# Patient Record
Sex: FEMALE | Race: WHITE | Employment: OTHER | URBAN - METROPOLITAN AREA
[De-identification: names, ages, dates, MRNs, and addresses within clinical notes are randomized per-mention and may not be internally consistent; named-entity substitution may affect disease eponyms.]

---

## 2022-12-12 ENCOUNTER — OFFICE VISIT (OUTPATIENT)
Dept: PRIMARY CARE CLINIC | Age: 37
End: 2022-12-12
Payer: MEDICARE

## 2022-12-12 VITALS
WEIGHT: 134.6 LBS | SYSTOLIC BLOOD PRESSURE: 116 MMHG | HEART RATE: 102 BPM | OXYGEN SATURATION: 98 % | DIASTOLIC BLOOD PRESSURE: 78 MMHG | TEMPERATURE: 102 F

## 2022-12-12 DIAGNOSIS — R11.0 NAUSEA: ICD-10-CM

## 2022-12-12 DIAGNOSIS — R06.2 WHEEZING: ICD-10-CM

## 2022-12-12 DIAGNOSIS — R05.9 COUGH, UNSPECIFIED TYPE: Primary | ICD-10-CM

## 2022-12-12 DIAGNOSIS — R68.83 CHILLS: ICD-10-CM

## 2022-12-12 LAB
Lab: NORMAL
PERFORMING INSTRUMENT: NORMAL
QC PASS/FAIL: NORMAL
SARS-COV-2, POC: NORMAL

## 2022-12-12 PROCEDURE — 99203 OFFICE O/P NEW LOW 30 MIN: CPT | Performed by: NURSE PRACTITIONER

## 2022-12-12 PROCEDURE — 87426 SARSCOV CORONAVIRUS AG IA: CPT | Performed by: NURSE PRACTITIONER

## 2022-12-12 RX ORDER — DEXTROMETHORPHAN POLISTIREX 30 MG/5ML
60 SUSPENSION ORAL 2 TIMES DAILY PRN
Qty: 89 ML | Refills: 0 | Status: SHIPPED | OUTPATIENT
Start: 2022-12-12 | End: 2022-12-22

## 2022-12-12 RX ORDER — AMOXICILLIN AND CLAVULANATE POTASSIUM 875; 125 MG/1; MG/1
1 TABLET, FILM COATED ORAL 2 TIMES DAILY
Qty: 20 TABLET | Refills: 0 | Status: SHIPPED | OUTPATIENT
Start: 2022-12-12 | End: 2022-12-22

## 2022-12-12 RX ORDER — CYCLOBENZAPRINE HCL 10 MG
TABLET ORAL
COMMUNITY
Start: 2022-12-10

## 2022-12-12 RX ORDER — PREDNISONE 10 MG/1
10 TABLET ORAL 2 TIMES DAILY
Qty: 10 TABLET | Refills: 0 | Status: SHIPPED | OUTPATIENT
Start: 2022-12-12 | End: 2022-12-17

## 2022-12-12 RX ORDER — GOLIMUMAB 50 MG/.5ML
INJECTION, SOLUTION SUBCUTANEOUS
COMMUNITY
Start: 2022-11-23

## 2022-12-12 RX ORDER — ASPIRIN 81 MG/1
81 TABLET, CHEWABLE ORAL DAILY
COMMUNITY

## 2022-12-12 RX ORDER — LORAZEPAM 1 MG/1
TABLET ORAL
COMMUNITY
Start: 2022-12-10

## 2022-12-12 ASSESSMENT — PATIENT HEALTH QUESTIONNAIRE - PHQ9
SUM OF ALL RESPONSES TO PHQ9 QUESTIONS 1 & 2: 0
SUM OF ALL RESPONSES TO PHQ QUESTIONS 1-9: 0
SUM OF ALL RESPONSES TO PHQ QUESTIONS 1-9: 0
1. LITTLE INTEREST OR PLEASURE IN DOING THINGS: 0
2. FEELING DOWN, DEPRESSED OR HOPELESS: 0
SUM OF ALL RESPONSES TO PHQ QUESTIONS 1-9: 0
SUM OF ALL RESPONSES TO PHQ QUESTIONS 1-9: 0

## 2022-12-12 NOTE — LETTER
[address] [facility] [address]  Phone: [phone]  Fax: [phone]    WILLIAM Johnson CNP        December 12, 2022     Patient: Mickie Gleason   YOB: 1985   Date of Visit: 12/12/2022       To Whom It May Concern: It is my medical opinion that Zuleyka Rios should remain out of work until 14 Dec 2022. If you have any questions or concerns, please don't hesitate to call.     Sincerely,            WILLIAM Johnson CNP

## 2022-12-12 NOTE — PROGRESS NOTES
Chief Complaint   Fever, Cough (X 1 day  ), Headache, Fatigue, Nausea, Chills, and Wheezing      History of Present Illness   Source of history provided by:  patientKimmy Munoz is a 40 y.o. old female who presents to the flu clinic with complaints of Fever, Cough (X 1 day  ), Headache, Fatigue, Nausea, Chills, and Wheezing x 1 days. States symptoms have stayed the same since onset. Has been taking none without symptomatic relief. Denies any Shortness of breath. Denies any hx of no history of pneumonia or bronchitis. ROS   Pertinent positives and negatives are stated within HPI, all other systems reviewed and are negative. Past Medical History:  has no past medical history on file. Past Surgical History:  has no past surgical history on file. Social History:  reports that she has never smoked. She has never used smokeless tobacco.  Family History: family history is not on file. Allergies: Doxycycline, Cephalexin, Iodinated diagnostic agents, Mercaptopurine, Mesalamine, Nsaids, Azithromycin, and Sulfa antibiotics    Physical Exam   Vital Signs:  /78 (Site: Left Upper Arm, Position: Sitting, Cuff Size: Large Adult)   Pulse (!) 102   Temp (!) 102 °F (38.9 °C) (Temporal)   Wt 134 lb 9.6 oz (61.1 kg)   SpO2 98%    Oxygen Saturation Interpretation: Normal.    Constitutional:  Alert, development consistent with age. NAD. Head:  NC/NT. Airway patent. Ears: TMs Clear bilaterally. Canals without exudate or swelling bilaterally. Mouth: Posterior pharynx with mild erythema and clear postnasal drip. no tonsillar hypertrophy or exudate. Neck:  Normal ROM. Supple. no anterior cervical adenopathy noted. Lungs: CTAB without wheezes, rales, or rhonchi. CV:  Regular rate and rhythm, normal heart sounds, without pathological murmurs, ectopy, gallops, or rubs. Skin:  Normal turgor. Warm, dry, without visible rash. Lymphatic: No lymphangitis or adenopathy noted. Neurological:  Oriented.

## 2023-02-16 PROBLEM — R79.0 LOW MAGNESIUM LEVEL: Status: ACTIVE | Noted: 2023-02-16

## 2023-02-16 PROBLEM — E87.6 LOW BLOOD POTASSIUM: Status: ACTIVE | Noted: 2023-02-16

## 2023-02-16 PROBLEM — R00.2 HEART PALPITATIONS: Status: ACTIVE | Noted: 2023-02-16

## 2023-02-16 PROBLEM — R35.89 POLYURIA: Status: ACTIVE | Noted: 2023-02-16

## 2023-02-16 PROBLEM — R07.89 ATYPICAL CHEST PAIN: Status: ACTIVE | Noted: 2023-02-16

## 2023-02-16 PROBLEM — M85.80 OSTEOPENIA: Status: ACTIVE | Noted: 2023-02-16

## 2023-02-16 PROBLEM — F41.0 PANIC DISORDER WITHOUT AGORAPHOBIA: Status: ACTIVE | Noted: 2023-02-16

## 2023-02-16 PROBLEM — Q21.12 PATENT FORAMEN OVALE (HHS-HCC): Status: ACTIVE | Noted: 2023-02-16

## 2023-02-16 PROBLEM — G45.9 TIA (TRANSIENT ISCHEMIC ATTACK): Status: ACTIVE | Noted: 2023-02-16

## 2023-02-16 PROBLEM — S91.332A PUNCTURE WOUND OF LEFT FOOT: Status: ACTIVE | Noted: 2023-02-16

## 2023-02-16 PROBLEM — Z87.898 HISTORY OF SEIZURE: Status: ACTIVE | Noted: 2023-02-16

## 2023-02-16 PROBLEM — R59.1 LYMPHADENOPATHY: Status: ACTIVE | Noted: 2023-02-16

## 2023-02-16 PROBLEM — U07.1 COVID-19 VIRUS DETECTED: Status: ACTIVE | Noted: 2023-02-16

## 2023-02-16 PROBLEM — R40.0 DAYTIME SOMNOLENCE: Status: ACTIVE | Noted: 2023-02-16

## 2023-02-16 PROBLEM — Z86.73 HISTORY OF STROKE: Status: ACTIVE | Noted: 2023-02-16

## 2023-02-16 PROBLEM — T24.202A SECOND DEGREE BURN OF LEFT LEG: Status: ACTIVE | Noted: 2023-02-16

## 2023-02-16 PROBLEM — R53.83 FATIGUE: Status: ACTIVE | Noted: 2023-02-16

## 2023-02-16 PROBLEM — E55.9 VITAMIN D DEFICIENCY: Status: ACTIVE | Noted: 2023-02-16

## 2023-02-16 PROBLEM — R07.9 CHEST PAIN: Status: ACTIVE | Noted: 2023-02-16

## 2023-02-16 PROBLEM — M25.50 JOINT PAIN: Status: ACTIVE | Noted: 2023-02-16

## 2023-02-16 PROBLEM — A49.02 MRSA INFECTION: Status: ACTIVE | Noted: 2023-02-16

## 2023-02-16 PROBLEM — R23.3 PETECHIAL RASH: Status: ACTIVE | Noted: 2023-02-16

## 2023-02-16 PROBLEM — K76.9 LIVER LESION: Status: ACTIVE | Noted: 2023-02-16

## 2023-02-16 PROBLEM — G47.33 OBSTRUCTIVE SLEEP APNEA: Status: ACTIVE | Noted: 2023-02-16

## 2023-02-16 PROBLEM — L02.31 ABSCESS, GLUTEAL: Status: ACTIVE | Noted: 2023-02-16

## 2023-02-16 PROBLEM — M06.9 RHEUMATOID ARTHRITIS (MULTI): Status: ACTIVE | Noted: 2023-02-16

## 2023-02-16 PROBLEM — K50.90 CROHN'S DISEASE (MULTI): Status: ACTIVE | Noted: 2023-02-16

## 2023-02-16 PROBLEM — R23.3 BRUISING, SPONTANEOUS: Status: ACTIVE | Noted: 2023-02-16

## 2023-02-16 PROBLEM — F41.9 ANXIETY: Status: ACTIVE | Noted: 2023-02-16

## 2023-02-16 RX ORDER — CYCLOBENZAPRINE HCL 10 MG
1 TABLET ORAL 2 TIMES DAILY
COMMUNITY
End: 2023-08-21 | Stop reason: SDUPTHER

## 2023-02-16 RX ORDER — ST. JOHN'S WORT 300 MG
1 CAPSULE ORAL 2 TIMES DAILY
COMMUNITY
Start: 2022-03-30

## 2023-02-16 RX ORDER — LANOLIN ALCOHOL/MO/W.PET/CERES
1 CREAM (GRAM) TOPICAL 2 TIMES DAILY
COMMUNITY
Start: 2021-03-03

## 2023-02-16 RX ORDER — LORAZEPAM 1 MG/1
1 TABLET ORAL 2 TIMES DAILY
COMMUNITY
End: 2023-03-13 | Stop reason: SDUPTHER

## 2023-02-16 RX ORDER — ASPIRIN 81 MG/1
1 TABLET ORAL DAILY
COMMUNITY
Start: 2021-03-09

## 2023-02-16 RX ORDER — HYDROXYZINE HYDROCHLORIDE 25 MG/1
1 TABLET, FILM COATED ORAL 3 TIMES DAILY PRN
COMMUNITY
Start: 2021-10-06 | End: 2023-08-21 | Stop reason: SDUPTHER

## 2023-02-16 RX ORDER — GOLIMUMAB 50 MG/.5ML
INJECTION, SOLUTION SUBCUTANEOUS
COMMUNITY
Start: 2021-07-27

## 2023-02-16 RX ORDER — ASPIRIN 325 MG
1 TABLET, DELAYED RELEASE (ENTERIC COATED) ORAL
COMMUNITY
Start: 2020-11-18

## 2023-03-06 LAB
ALANINE AMINOTRANSFERASE (SGPT) (U/L) IN SER/PLAS: 11 U/L (ref 7–45)
ALBUMIN (G/DL) IN SER/PLAS: 4.2 G/DL (ref 3.4–5)
ALKALINE PHOSPHATASE (U/L) IN SER/PLAS: 48 U/L (ref 33–110)
ANION GAP IN SER/PLAS: 13 MMOL/L (ref 10–20)
ASPARTATE AMINOTRANSFERASE (SGOT) (U/L) IN SER/PLAS: 13 U/L (ref 9–39)
BILIRUBIN TOTAL (MG/DL) IN SER/PLAS: 0.3 MG/DL (ref 0–1.2)
C REACTIVE PROTEIN (MG/L) IN SER/PLAS: <0.1 MG/DL
C. DIFFICILE TOXIN, PCR: NORMAL
CALCIUM (MG/DL) IN SER/PLAS: 9 MG/DL (ref 8.6–10.3)
CALPROTECTIN, STOOL: NORMAL
CARBON DIOXIDE, TOTAL (MMOL/L) IN SER/PLAS: 27 MMOL/L (ref 21–32)
CHLORIDE (MMOL/L) IN SER/PLAS: 101 MMOL/L (ref 98–107)
CLOSTRIDIUM DIFFICILE NAP 1 STRAIN (PRESUMPTIVE): NORMAL
COBALAMIN (VITAMIN B12) (PG/ML) IN SER/PLAS: 1440 PG/ML (ref 211–911)
CREATININE (MG/DL) IN SER/PLAS: 0.69 MG/DL (ref 0.5–1.05)
ERYTHROCYTE DISTRIBUTION WIDTH (RATIO) BY AUTOMATED COUNT: 13.1 % (ref 11.5–14.5)
ERYTHROCYTE MEAN CORPUSCULAR HEMOGLOBIN CONCENTRATION (G/DL) BY AUTOMATED: 32.3 G/DL (ref 32–36)
ERYTHROCYTE MEAN CORPUSCULAR VOLUME (FL) BY AUTOMATED COUNT: 91 FL (ref 80–100)
ERYTHROCYTES (10*6/UL) IN BLOOD BY AUTOMATED COUNT: 4.4 X10E12/L (ref 4–5.2)
FERRITIN (UG/LL) IN SER/PLAS: 34 UG/L (ref 8–150)
GFR FEMALE: >90 ML/MIN/1.73M2
GLUCOSE (MG/DL) IN SER/PLAS: 83 MG/DL (ref 74–99)
HEMATOCRIT (%) IN BLOOD BY AUTOMATED COUNT: 39.9 % (ref 36–46)
HEMOGLOBIN (G/DL) IN BLOOD: 12.9 G/DL (ref 12–16)
IRON (UG/DL) IN SER/PLAS: 86 UG/DL (ref 35–150)
IRON BINDING CAPACITY (UG/DL) IN SER/PLAS: 333 UG/DL (ref 240–445)
IRON SATURATION (%) IN SER/PLAS: 26 % (ref 25–45)
LEUKOCYTES (10*3/UL) IN BLOOD BY AUTOMATED COUNT: 7.5 X10E9/L (ref 4.4–11.3)
PLATELETS (10*3/UL) IN BLOOD AUTOMATED COUNT: 448 X10E9/L (ref 150–450)
POTASSIUM (MMOL/L) IN SER/PLAS: 3.9 MMOL/L (ref 3.5–5.3)
PROTEIN TOTAL: 7.2 G/DL (ref 6.4–8.2)
SODIUM (MMOL/L) IN SER/PLAS: 137 MMOL/L (ref 136–145)
UREA NITROGEN (MG/DL) IN SER/PLAS: 17 MG/DL (ref 6–23)

## 2023-03-09 ENCOUNTER — TELEPHONE (OUTPATIENT)
Dept: PRIMARY CARE | Facility: CLINIC | Age: 38
End: 2023-03-09
Payer: COMMERCIAL

## 2023-03-09 NOTE — TELEPHONE ENCOUNTER
Patient has been unable to use CPAP due to an ill-fitting mask, waiting to get replacement from [facility]. Will discus at 3/20/23 visit

## 2023-03-13 DIAGNOSIS — F43.0 PANIC ATTACK AS REACTION TO STRESS: Primary | ICD-10-CM

## 2023-03-13 DIAGNOSIS — F41.0 PANIC ATTACK AS REACTION TO STRESS: Primary | ICD-10-CM

## 2023-03-13 RX ORDER — LORAZEPAM 1 MG/1
1 TABLET ORAL EVERY 8 HOURS PRN
Qty: 24 TABLET | Refills: 0 | Status: SHIPPED | OUTPATIENT
Start: 2023-03-13 | End: 2023-05-17 | Stop reason: SDUPTHER

## 2023-03-20 ENCOUNTER — APPOINTMENT (OUTPATIENT)
Dept: PRIMARY CARE | Facility: CLINIC | Age: 38
End: 2023-03-20
Payer: COMMERCIAL

## 2023-04-17 ENCOUNTER — TELEPHONE (OUTPATIENT)
Dept: PRIMARY CARE | Facility: CLINIC | Age: 38
End: 2023-04-17
Payer: COMMERCIAL

## 2023-04-17 NOTE — TELEPHONE ENCOUNTER
Pt left message wanting to let you the lymph node issues are back, she can barely talk. Asking who you suggest she see for this?

## 2023-04-19 ENCOUNTER — OFFICE VISIT (OUTPATIENT)
Dept: PRIMARY CARE | Facility: CLINIC | Age: 38
End: 2023-04-19
Payer: COMMERCIAL

## 2023-04-19 VITALS
DIASTOLIC BLOOD PRESSURE: 78 MMHG | WEIGHT: 139 LBS | HEIGHT: 61 IN | SYSTOLIC BLOOD PRESSURE: 116 MMHG | BODY MASS INDEX: 26.24 KG/M2

## 2023-04-19 DIAGNOSIS — R59.1 LYMPHADENOPATHY: ICD-10-CM

## 2023-04-19 DIAGNOSIS — M99.07 SEGMENTAL AND SOMATIC DYSFUNCTION OF UPPER EXTREMITY: ICD-10-CM

## 2023-04-19 DIAGNOSIS — M99.08 SEGMENTAL AND SOMATIC DYSFUNCTION OF RIB CAGE: ICD-10-CM

## 2023-04-19 DIAGNOSIS — K50.018 CROHN'S DISEASE OF SMALL INTESTINE WITH OTHER COMPLICATION (MULTI): ICD-10-CM

## 2023-04-19 DIAGNOSIS — M99.01 SEGMENTAL AND SOMATIC DYSFUNCTION OF CERVICAL REGION: ICD-10-CM

## 2023-04-19 DIAGNOSIS — M99.02 SEGMENTAL AND SOMATIC DYSFUNCTION OF THORACIC REGION: ICD-10-CM

## 2023-04-19 DIAGNOSIS — M54.2 CERVICALGIA: ICD-10-CM

## 2023-04-19 DIAGNOSIS — J04.0 LARYNGITIS: Primary | ICD-10-CM

## 2023-04-19 DIAGNOSIS — M99.00 SEGMENTAL AND SOMATIC DYSFUNCTION OF HEAD REGION: ICD-10-CM

## 2023-04-19 PROCEDURE — 1036F TOBACCO NON-USER: CPT | Performed by: FAMILY MEDICINE

## 2023-04-19 PROCEDURE — 99214 OFFICE O/P EST MOD 30 MIN: CPT | Performed by: FAMILY MEDICINE

## 2023-04-19 RX ORDER — PREDNISOLONE SODIUM PHOSPHATE 15 MG/5ML
30 SOLUTION ORAL 2 TIMES DAILY
Qty: 40 ML | Refills: 0 | Status: SHIPPED | OUTPATIENT
Start: 2023-04-19 | End: 2023-04-21

## 2023-04-19 NOTE — PROGRESS NOTES
Subjective   Patient ID: Ryann Kuhn is a 37 y.o. female who presents for Follow-up (Go over results, issue with cpap).  HPI  General follow up. Discuss concerns about swollen lymph nodes, issues with cpap    Having issues with the Lymph nodes.  States that at the end of her cycle of her biologic she starts to have swollen lymph nodes and pain.  Very concerned about the swollen lymph nodes that she has at this time in her neck because she is supposed to be presenting at a conference and is supposed to be getting an award.    Reviewed MRI of the abdomen in depth with the patient.  She has a stable structure at the terminal ileum.  No other significant abnormalities.  This is the area where she is having the bloating and pain in her abdomen.    Using endocort with some relief with the bowels    Review of Systems    Objective   Physical Exam  General: Patient is alert and oriented ×3 and appears  very anxious.  No respiratory distress.    Head: Atraumatic normocephalic.    Eyes: EOMI, PERRLA      Neck: Decreased range of motion.  Thyroid is palpable without any nodules.  Patient did have anterior cervical adenopathy    Heart: Regular rate and rhythm, no murmurs clicks or gallops    Lungs: Clear to auscultation bilaterally without any rhonchi rales or wheezing, lung sounds heard throughout all lung fields    Abdomen: Soft, tenderness in the right lower quadrant with some bloating, no rigidity, rebound, guarding or organomegaly. Bowel sounds ×4 quadrants.    Musculoskeletal: Decreased range of motion, strength is grossly intact in the proximal distal muscles of the upper and lower extremities bilaterally, deep tendon reflexes +2 out of 4 and symmetric bilaterally at the patella, Achilles, biceps, triceps, sensation intact.    Osteopathic: In the head region there is increase suboccipital tension, and cervical region C3 posterior tender point on the left, first rib on the left was tender to palpation resisted  exhalation and there was a strain counterstrain tender point, in the thoracic region T3 was flexed rotated left side bent left, right upper extremity was protracted inferior tension of pectoralis minor and left upper extremity was also corrected protracted with tension at the pectoralis minor    Nerves: Cranial nerves II through XII appear grossly intact and without deficit    Skin: Intact, dry, no rashes or erythema    Psych: Anxious and tearful  Assessment/Plan   Problem List Items Addressed This Visit       Crohn's disease (CMS/HCC)    Lymphadenopathy     Other Visit Diagnoses       Laryngitis    -  Primary    Relevant Medications    prednisoLONE (OrapRED) 15 mg/5 mL (3 mg/mL) solution    Cervicalgia        Segmental and somatic dysfunction of head region        Segmental and somatic dysfunction of cervical region        Segmental and somatic dysfunction of rib cage        Segmental and somatic dysfunction of thoracic region        Segmental and somatic dysfunction of upper extremity            Osteopathic manipulative therapy was utilized  In the head region as suboccipital release  In the cervical region as functional positional release  In the upper extremity as muscle energy  In the rib region as functional positional release  In the Thoracics as high velocity low amplitude thrust and muscle energy    Patient tolerated the procedure well and noticed improvement after the treatment.    Instructed to drink plenty of water    Suggested Epsom salt bath    Can take Arnica

## 2023-04-26 LAB
C REACTIVE PROTEIN (MG/L) IN SER/PLAS BY HIGH SENSIT: 0.5 MG/L
TROPONIN I, HIGH SENSITIVITY: <3 NG/L (ref 0–34)

## 2023-05-03 ENCOUNTER — TELEPHONE (OUTPATIENT)
Dept: PRIMARY CARE | Facility: CLINIC | Age: 38
End: 2023-05-03
Payer: COMMERCIAL

## 2023-05-03 NOTE — TELEPHONE ENCOUNTER
Ryann called saying her work is giving her a lot of crap and needs a new note stating all the dates she was in the office and for tests from December through now. They are putting her through the ringer and wont excuse her from work unless they get a new note. Fax to Attn: Tara @ [phone]

## 2023-05-16 LAB
ALANINE AMINOTRANSFERASE (SGPT) (U/L) IN SER/PLAS: 12 U/L (ref 7–45)
ALBUMIN (G/DL) IN SER/PLAS: 4.1 G/DL (ref 3.4–5)
ALKALINE PHOSPHATASE (U/L) IN SER/PLAS: 47 U/L (ref 33–110)
ANION GAP IN SER/PLAS: 9 MMOL/L (ref 10–20)
ASPARTATE AMINOTRANSFERASE (SGOT) (U/L) IN SER/PLAS: 12 U/L (ref 9–39)
BASOPHILS (10*3/UL) IN BLOOD BY AUTOMATED COUNT: 0.05 X10E9/L (ref 0–0.1)
BASOPHILS/100 LEUKOCYTES IN BLOOD BY AUTOMATED COUNT: 0.5 % (ref 0–2)
BILIRUBIN TOTAL (MG/DL) IN SER/PLAS: 0.4 MG/DL (ref 0–1.2)
C REACTIVE PROTEIN (MG/L) IN SER/PLAS BY HIGH SENSIT: 0.2 MG/L
CALCIUM (MG/DL) IN SER/PLAS: 9.1 MG/DL (ref 8.6–10.3)
CARBON DIOXIDE, TOTAL (MMOL/L) IN SER/PLAS: 28 MMOL/L (ref 21–32)
CHLORIDE (MMOL/L) IN SER/PLAS: 106 MMOL/L (ref 98–107)
CREATININE (MG/DL) IN SER/PLAS: 0.67 MG/DL (ref 0.5–1.05)
EOSINOPHILS (10*3/UL) IN BLOOD BY AUTOMATED COUNT: 0.32 X10E9/L (ref 0–0.7)
EOSINOPHILS/100 LEUKOCYTES IN BLOOD BY AUTOMATED COUNT: 3.5 % (ref 0–6)
ERYTHROCYTE DISTRIBUTION WIDTH (RATIO) BY AUTOMATED COUNT: 13.9 % (ref 11.5–14.5)
ERYTHROCYTE MEAN CORPUSCULAR HEMOGLOBIN CONCENTRATION (G/DL) BY AUTOMATED: 32.4 G/DL (ref 32–36)
ERYTHROCYTE MEAN CORPUSCULAR VOLUME (FL) BY AUTOMATED COUNT: 91 FL (ref 80–100)
ERYTHROCYTES (10*6/UL) IN BLOOD BY AUTOMATED COUNT: 4.5 X10E12/L (ref 4–5.2)
GFR FEMALE: >90 ML/MIN/1.73M2
GLUCOSE (MG/DL) IN SER/PLAS: 76 MG/DL (ref 74–99)
HEMATOCRIT (%) IN BLOOD BY AUTOMATED COUNT: 41 % (ref 36–46)
HEMOGLOBIN (G/DL) IN BLOOD: 13.3 G/DL (ref 12–16)
IMMATURE GRANULOCYTES/100 LEUKOCYTES IN BLOOD BY AUTOMATED COUNT: 0.2 % (ref 0–0.9)
LEUKOCYTES (10*3/UL) IN BLOOD BY AUTOMATED COUNT: 9.3 X10E9/L (ref 4.4–11.3)
LYMPHOCYTES (10*3/UL) IN BLOOD BY AUTOMATED COUNT: 3.16 X10E9/L (ref 1.2–4.8)
LYMPHOCYTES/100 LEUKOCYTES IN BLOOD BY AUTOMATED COUNT: 34.1 % (ref 13–44)
MONOCYTES (10*3/UL) IN BLOOD BY AUTOMATED COUNT: 0.71 X10E9/L (ref 0.1–1)
MONOCYTES/100 LEUKOCYTES IN BLOOD BY AUTOMATED COUNT: 7.7 % (ref 2–10)
NEUTROPHILS (10*3/UL) IN BLOOD BY AUTOMATED COUNT: 5 X10E9/L (ref 1.2–7.7)
NEUTROPHILS/100 LEUKOCYTES IN BLOOD BY AUTOMATED COUNT: 54 % (ref 40–80)
PLATELETS (10*3/UL) IN BLOOD AUTOMATED COUNT: 424 X10E9/L (ref 150–450)
POTASSIUM (MMOL/L) IN SER/PLAS: 3.7 MMOL/L (ref 3.5–5.3)
PROTEIN TOTAL: 7.2 G/DL (ref 6.4–8.2)
SODIUM (MMOL/L) IN SER/PLAS: 139 MMOL/L (ref 136–145)
UREA NITROGEN (MG/DL) IN SER/PLAS: 12 MG/DL (ref 6–23)

## 2023-05-17 ENCOUNTER — TELEPHONE (OUTPATIENT)
Dept: PRIMARY CARE | Facility: CLINIC | Age: 38
End: 2023-05-17

## 2023-05-17 ENCOUNTER — TELEMEDICINE (OUTPATIENT)
Dept: PRIMARY CARE | Facility: CLINIC | Age: 38
End: 2023-05-17
Payer: COMMERCIAL

## 2023-05-17 DIAGNOSIS — F43.0 PANIC ATTACK AS REACTION TO STRESS: ICD-10-CM

## 2023-05-17 DIAGNOSIS — F41.0 PANIC ATTACK AS REACTION TO STRESS: ICD-10-CM

## 2023-05-17 DIAGNOSIS — G45.9 TIA (TRANSIENT ISCHEMIC ATTACK): Primary | ICD-10-CM

## 2023-05-17 PROCEDURE — 99443 PR PHYS/QHP TELEPHONE EVALUATION 21-30 MIN: CPT | Performed by: FAMILY MEDICINE

## 2023-05-17 RX ORDER — LORAZEPAM 1 MG/1
1 TABLET ORAL EVERY 8 HOURS PRN
Qty: 18 TABLET | Refills: 0 | Status: SHIPPED | OUTPATIENT
Start: 2023-05-17 | End: 2023-08-21 | Stop reason: SDUPTHER

## 2023-05-17 ASSESSMENT — ENCOUNTER SYMPTOMS
FATIGUE: 1
NECK PAIN: 0
FOCAL SENSORY LOSS: 1
DIZZINESS: 1
LIGHT-HEADEDNESS: 1
HEADACHES: 1
ALTERED MENTAL STATUS: 1
ABDOMINAL PAIN: 1
NEUROLOGIC COMPLAINT: 1
FEVER: 0
VISUAL CHANGE: 1
LOSS OF BALANCE: 1
VOMITING: 0
DIAPHORESIS: 1
BOWEL INCONTINENCE: 1
VERTIGO: 1
SHORTNESS OF BREATH: 1
PALPITATIONS: 1
BACK PAIN: 1
WEAKNESS: 1
NAUSEA: 1
AURA: 1
SLURRED SPEECH: 1
CONFUSION: 1
CLUMSINESS: 1
MEMORY LOSS: 1
FOCAL WEAKNESS: 1
NEAR-SYNCOPE: 1

## 2023-05-17 NOTE — PROGRESS NOTES
Subjective   Patient ID: Ryann Kuhn is a 37 y.o. female who presents for No chief complaint on file..  HPI  Patient is having issues with bruising. She feels like it makes her legs hurt. On ASA.   She feels like bones hurt.  Feels like dizzy. She is collapsing. Had to pull over on the way to work.  She fells she had blacked out and she had some blood on her head.    She had last Simponi and it seems to stop after 14 days..  She is following with GI.   She is feeling like she is not being heard and is worried about not getting answers right now.    She is having episodes where she is getting   Just started her period.  Had large blood in the urine.  She starts to feel faint and then gets hot and dizzy.   Neurologist  has been a while  She had been doing 3 back to back events. She worked to much and this may have contributed  Review of Systems    Objective   Physical Exam    Assessment/Plan   Problem List Items Addressed This Visit    None  Spent greater than 21 minutes on the phone discussing the patient's current issues and reviewing the emergency department record and developing a plan.  We also reviewed the patient's previous MRIs which she had done in 2022 and 2020.  There were no changes at those times.  Discussed with the patient that she does need to follow-up with her neurologist.  Currently is not having any neurological symptoms but she felt she was having neurological symptoms previously when she was at the ER because she was having problems finding words and felt that she had facial droop and weakness.  We did discuss that she is overextending herself and that she needs to budget the amount of energy that she is spending otherwise she will continue to have flares like this.  Her gastroenterologist and rheumatologist are discussing ways to help her with a biologic agent that may work better for her.  It seems like the biologic agent she is currently using is wearing off well before she is able to  get her next dose.  She was instructed to contact us if she has any worsening of her symptoms.  Today the patient did seem very anxious and we did refill her lorazepam after reviewing her OARRS report and finding that there was no aberrant behavior.  If she develops neurologic symptoms she was instructed to go to the emergency department.

## 2023-05-17 NOTE — TELEPHONE ENCOUNTER
Per our conversation, patient was in ER earlier today, stated they did not do anything for her. Could we possibly do a virtual visit?

## 2023-05-22 NOTE — TELEPHONE ENCOUNTER
Pt left message that she was doing some research on the [employer] website and read that she should not be taking corticosteroids with [employer]. So she quit all of her steroids on Thursday and is feeling much better. Has appt with gastro this afternoon.

## 2023-08-21 ENCOUNTER — TELEMEDICINE (OUTPATIENT)
Dept: PRIMARY CARE | Facility: CLINIC | Age: 38
End: 2023-08-21
Payer: COMMERCIAL

## 2023-08-21 ENCOUNTER — APPOINTMENT (OUTPATIENT)
Dept: PRIMARY CARE | Facility: CLINIC | Age: 38
End: 2023-08-21
Payer: COMMERCIAL

## 2023-08-21 DIAGNOSIS — F41.0 PANIC ATTACK AS REACTION TO STRESS: ICD-10-CM

## 2023-08-21 DIAGNOSIS — G56.03 BILATERAL CARPAL TUNNEL SYNDROME: Primary | ICD-10-CM

## 2023-08-21 DIAGNOSIS — F43.0 PANIC ATTACK AS REACTION TO STRESS: ICD-10-CM

## 2023-08-21 PROCEDURE — 99214 OFFICE O/P EST MOD 30 MIN: CPT | Performed by: FAMILY MEDICINE

## 2023-08-21 RX ORDER — HYDROXYZINE HYDROCHLORIDE 25 MG/1
25 TABLET, FILM COATED ORAL 3 TIMES DAILY
Qty: 90 TABLET | Refills: 3 | Status: SHIPPED | OUTPATIENT
Start: 2023-08-21 | End: 2024-01-09

## 2023-08-21 RX ORDER — NAPROXEN 500 MG/1
500 TABLET ORAL 2 TIMES DAILY PRN
Qty: 60 TABLET | Refills: 5 | Status: SHIPPED | OUTPATIENT
Start: 2023-08-21 | End: 2024-04-17

## 2023-08-21 RX ORDER — CYCLOBENZAPRINE HCL 10 MG
10 TABLET ORAL 2 TIMES DAILY
Qty: 60 TABLET | Refills: 3 | Status: SHIPPED | OUTPATIENT
Start: 2023-08-21 | End: 2024-08-20

## 2023-08-21 RX ORDER — LORAZEPAM 1 MG/1
1 TABLET ORAL EVERY 8 HOURS PRN
Qty: 18 TABLET | Refills: 0 | Status: SHIPPED | OUTPATIENT
Start: 2023-08-21 | End: 2023-08-27

## 2023-08-21 NOTE — PROGRESS NOTES
Subjective   Patient ID: Ryann Kuhn is a 37 y.o. female who presents for No chief complaint on file..  HPI  Patient is having worsening anxiety.  Has more stress from job.  Has been out of lorazepam and hydroxyzine.  Is going to be taking a trip but she gets anxiety about leaving for trips as well.    She is having issues with tingling in her fingers bilaterally.  Has had increased activity at her job where she is typing a lot more.  She is had carpal tunnel in the past and was given exercises for her hands and she states she is doing them but not getting relief.  Had talked with her gastroenterologist and rheumatologist about changing her biologic agent for her Crohn's and rheumatological issues but they stated they would like to continue on her Simponi right now.  Review of Systems    Objective   Physical Exam    Assessment/Plan   Problem List Items Addressed This Visit    None  Visit Diagnoses       Bilateral carpal tunnel syndrome    -  Primary    Relevant Medications    cyclobenzaprine (Flexeril) 10 mg tablet    naproxen (Naprosyn) 500 mg tablet    Other Relevant Orders    Referral to Orthopaedic Surgery    Panic attack as reaction to stress        Relevant Medications    hydrOXYzine HCL (Atarax) 25 mg tablet    LORazepam (Ativan) 1 mg tablet        Bilateral carpal tunnel  - Instructed her to take B6 100 mg 2 times a day  - Given naproxen 500 mg twice daily  - Discussed exercises and icing  - Sent to Dr. Benson for further evaluation and treatment  - This appears to be due to overuse    Anxiety and panic attack  - Refilled hydroxyzine  - Reviewed patient's prescription history on OARRS and gave her a small prescription for lorazepam to take only as needed  - Continue with counselor  - I would like to start antianxiety medication that she would take on a daily basis

## 2023-08-21 NOTE — PROGRESS NOTES
Subjective   Reason for Visit: Ryann Kuhn is an 37 y.o. female here for a Medicare Wellness visit.               HPI    Patient Care Team:  Mehul Suarez DO as PCP - General  Mehul Suarez DO as PCP - [employer] PCP     Review of Systems    Objective   Vitals:  There were no vitals taken for this visit.      Physical Exam    Assessment/Plan   Problem List Items Addressed This Visit    None

## 2023-10-16 ENCOUNTER — HOSPITAL ENCOUNTER (OUTPATIENT)
Dept: RADIOLOGY | Facility: HOSPITAL | Age: 38
Discharge: HOME | End: 2023-10-16
Payer: COMMERCIAL

## 2023-10-16 ENCOUNTER — OFFICE VISIT (OUTPATIENT)
Dept: ORTHOPEDIC SURGERY | Facility: CLINIC | Age: 38
End: 2023-10-16
Payer: COMMERCIAL

## 2023-10-16 DIAGNOSIS — G56.03 BILATERAL CARPAL TUNNEL SYNDROME: ICD-10-CM

## 2023-10-16 DIAGNOSIS — M18.12 ARTHRITIS OF CARPOMETACARPAL (CMC) JOINT OF LEFT THUMB: Primary | ICD-10-CM

## 2023-10-16 DIAGNOSIS — M19.031 CMC DJD(CARPOMETACARPAL DEGENERATIVE JOINT DISEASE), LOCALIZED PRIMARY, RIGHT: ICD-10-CM

## 2023-10-16 PROCEDURE — L3924 HFO WITHOUT JOINTS PRE OTS: HCPCS | Performed by: ORTHOPAEDIC SURGERY

## 2023-10-16 PROCEDURE — 1036F TOBACCO NON-USER: CPT | Performed by: ORTHOPAEDIC SURGERY

## 2023-10-16 PROCEDURE — 99204 OFFICE O/P NEW MOD 45 MIN: CPT | Performed by: ORTHOPAEDIC SURGERY

## 2023-10-16 PROCEDURE — 73140 X-RAY EXAM OF FINGER(S): CPT | Performed by: RADIOLOGY

## 2023-10-16 PROCEDURE — 73140 X-RAY EXAM OF FINGER(S): CPT

## 2023-10-16 NOTE — PROGRESS NOTES
New patient  ref from [facility] Bilateral  carpal tunnel syndrome pain, stiffness, numbness & weakness / does have RA   Patient has had OT, pressure points and CBD creams   Left hand dom

## 2023-10-16 NOTE — PROGRESS NOTES
38 y.o. female presents today for evaluation of bilateral hand numbness, tingling, weakness and pain. The patient reports symptoms for months, getting worse.  Pain is controlled.  Reports no previous surgeries, injections or trauma to the area.  Reports pain worse with use, better at rest.  Pain numb and tingly, wakes them from sleep.   Worse driving a car and talking on a phone.  Also complains of bilateral base of thumb pain.  Worse opening doors and jars.  Pain worse with use, better at rest, dull ache, sharp at times.  Right worse than left.     Review of Systems    Constitutional: no fever, no chills, not feeling tired, no recent weight gain and no recent weight loss.   ENT: no nosebleeds.   Cardiovascular: no chest pain.   Respiratory: no shortness of breath and no cough.   Gastrointestinal: no abdominal pain, no nausea, no vomiting and no diarrhea.   Musculoskeletal: per HPI  Integumentary: no rashes and no skin wound.   Neurological: no headache.   Psychiatric: no depression and no sleep disturbances.   Endocrine: no muscle weakness and no muscle cramps.   Hematologic/Lymphatic: no swollen glands and no tendency for easy bruising.     All other systems have been reviewed and are negative for complaint    Patient's past medical history, past surgical history, allergies, and medications have been reviewed unless otherwise noted in the chart.     Carpal Tunnel Exam  Inspection:  no evidence of infection, no edema, no erythema, no ecchymosis, Palpation:  compartments are soft, no pain with palpation, Range of Motion:  full wrist and finger range of motion, Stability:  no wrist instability detected, Strength:  5/5 APB and intrinsics, Skin:  intact, Vascular:  capillary refill <2 seconds distally, Sensation:  decreased in the median nerve distribution, Test:  positive Tinel's at the Carpal Tunnel, positive Direct Carpal Tunnel Compression Test      CMC Arthritis Exam  Inspection:  no evidence of infection, no  edema, no erythema, no ecchymosis, Palpation:  compartments are soft, pain with palpation over the Thumb CMC joint, Range of Motion:  full wrist and finger range of motion, Stability:  no wrist or finger instability detected, Strength:  5/5  and pinch strength, Skin:  intact, Vascular:  capillary refill <2 seconds distally, Sensation:  intact to light touch distally, Tests:  negative Finkelstein's , positive Thumb CMC Grind.      Constitutional   General appearance: Alert and in no acute distress. Well developed, well nourished.    Eyes   External Eye, Conjunctiva and lids: Normal external exam - pupils were equal in size, round, reactive to light (PERRL) with normal accommodation and extraocular movements intact (EOMI).   Ears, Nose, Mouth, and Throat   Hearing: Normal.   Neck   Neck: No neck mass was observed. Supple.   Pulmonary   Respiratory effort: No respiratory distress.   Cardiovascular   Examination of extremities: No peripheral edema.   Abdomen   Abdomen: Soft nontender; no abdominal mass palpated.. No rebound, rigidity or guarding.    Skin   Skin and subcutaneous tissue: Normal skin color and pigmentation, normal skin turgor, and no rash.   Neurologic   Sensation: Normal.   Psychiatric   Judgment and insight: Intact.   Orientation to person, place, and time: Alert and oriented x 3.       Mood and affect: Normal.      Bilateral carpal tunnel syndrome and CMC DJD  I discussed the diagnosis and treatment options with the patient today along with their associated risks and benefits. After thorough discussion, the patient has elected to proceed with conservative management. All questions were answered to the patients satisfaction who seems satisfied with the plan.      Night splint  EMG  Comfortcool  Voltaren gel  FU after EMG - No XR

## 2023-11-16 ENCOUNTER — APPOINTMENT (OUTPATIENT)
Dept: NEUROLOGY | Facility: HOSPITAL | Age: 38
End: 2023-11-16
Payer: COMMERCIAL

## 2023-11-24 ENCOUNTER — HOSPITAL ENCOUNTER (OUTPATIENT)
Dept: NEUROLOGY | Facility: HOSPITAL | Age: 38
Discharge: HOME | End: 2023-11-24
Payer: COMMERCIAL

## 2023-11-24 DIAGNOSIS — G56.03 CARPAL TUNNEL SYNDROME, BILATERAL UPPER LIMBS: ICD-10-CM

## 2023-11-24 PROCEDURE — 95910 NRV CNDJ TEST 7-8 STUDIES: CPT | Performed by: PSYCHIATRY & NEUROLOGY

## 2023-11-24 PROCEDURE — 95886 MUSC TEST DONE W/N TEST COMP: CPT | Performed by: PSYCHIATRY & NEUROLOGY

## 2023-11-30 ENCOUNTER — TELEPHONE (OUTPATIENT)
Dept: GASTROENTEROLOGY | Facility: CLINIC | Age: 38
End: 2023-11-30
Payer: COMMERCIAL

## 2023-11-30 DIAGNOSIS — K50.00 CROHN'S DISEASE OF SMALL INTESTINE WITHOUT COMPLICATION (MULTI): Primary | ICD-10-CM

## 2023-11-30 RX ORDER — PREDNISONE 10 MG/1
TABLET ORAL
Qty: 70 TABLET | Refills: 0 | Status: SHIPPED | OUTPATIENT
Start: 2023-11-30 | End: 2023-12-27

## 2023-12-06 ENCOUNTER — OFFICE VISIT (OUTPATIENT)
Dept: GASTROENTEROLOGY | Facility: CLINIC | Age: 38
End: 2023-12-06
Payer: COMMERCIAL

## 2023-12-06 ENCOUNTER — HOSPITAL ENCOUNTER (OUTPATIENT)
Dept: RADIOLOGY | Facility: HOSPITAL | Age: 38
Discharge: HOME | End: 2023-12-06
Payer: COMMERCIAL

## 2023-12-06 ENCOUNTER — LAB (OUTPATIENT)
Dept: LAB | Facility: LAB | Age: 38
End: 2023-12-06
Payer: COMMERCIAL

## 2023-12-06 VITALS
OXYGEN SATURATION: 100 % | WEIGHT: 136 LBS | BODY MASS INDEX: 26.56 KG/M2 | DIASTOLIC BLOOD PRESSURE: 80 MMHG | HEART RATE: 77 BPM | SYSTOLIC BLOOD PRESSURE: 120 MMHG

## 2023-12-06 DIAGNOSIS — K50.018 CROHN'S DISEASE OF SMALL INTESTINE WITH OTHER COMPLICATION (MULTI): Primary | ICD-10-CM

## 2023-12-06 DIAGNOSIS — K50.018 CROHN'S DISEASE OF SMALL INTESTINE WITH OTHER COMPLICATION (MULTI): ICD-10-CM

## 2023-12-06 DIAGNOSIS — R10.84 GENERALIZED ABDOMINAL PAIN: ICD-10-CM

## 2023-12-06 LAB
ALBUMIN SERPL BCP-MCNC: 3.9 G/DL (ref 3.4–5)
ALP SERPL-CCNC: 45 U/L (ref 33–110)
ALT SERPL W P-5'-P-CCNC: 10 U/L (ref 7–45)
ANION GAP SERPL CALC-SCNC: 11 MMOL/L (ref 10–20)
AST SERPL W P-5'-P-CCNC: 9 U/L (ref 9–39)
BASOPHILS # BLD AUTO: 0.05 X10*3/UL (ref 0–0.1)
BASOPHILS NFR BLD AUTO: 0.4 %
BILIRUB SERPL-MCNC: 0.2 MG/DL (ref 0–1.2)
BUN SERPL-MCNC: 11 MG/DL (ref 6–23)
CALCIUM SERPL-MCNC: 8.8 MG/DL (ref 8.6–10.3)
CHLORIDE SERPL-SCNC: 103 MMOL/L (ref 98–107)
CO2 SERPL-SCNC: 27 MMOL/L (ref 21–32)
CREAT SERPL-MCNC: 0.66 MG/DL (ref 0.5–1.05)
CRP SERPL-MCNC: <0.1 MG/DL
EOSINOPHIL # BLD AUTO: 0.4 X10*3/UL (ref 0–0.7)
EOSINOPHIL NFR BLD AUTO: 3.2 %
ERYTHROCYTE [DISTWIDTH] IN BLOOD BY AUTOMATED COUNT: 13.4 % (ref 11.5–14.5)
GFR SERPL CREATININE-BSD FRML MDRD: >90 ML/MIN/1.73M*2
GLUCOSE SERPL-MCNC: 78 MG/DL (ref 74–99)
HCT VFR BLD AUTO: 42 % (ref 36–46)
HGB BLD-MCNC: 12.9 G/DL (ref 12–16)
IMM GRANULOCYTES # BLD AUTO: 0.05 X10*3/UL (ref 0–0.7)
IMM GRANULOCYTES NFR BLD AUTO: 0.4 % (ref 0–0.9)
LYMPHOCYTES # BLD AUTO: 5.85 X10*3/UL (ref 1.2–4.8)
LYMPHOCYTES NFR BLD AUTO: 47.3 %
MCH RBC QN AUTO: 28.7 PG (ref 26–34)
MCHC RBC AUTO-ENTMCNC: 30.7 G/DL (ref 32–36)
MCV RBC AUTO: 94 FL (ref 80–100)
MONOCYTES # BLD AUTO: 0.9 X10*3/UL (ref 0.1–1)
MONOCYTES NFR BLD AUTO: 7.3 %
NEUTROPHILS # BLD AUTO: 5.13 X10*3/UL (ref 1.2–7.7)
NEUTROPHILS NFR BLD AUTO: 41.4 %
NRBC BLD-RTO: 0 /100 WBCS (ref 0–0)
PLATELET # BLD AUTO: 460 X10*3/UL (ref 150–450)
POTASSIUM SERPL-SCNC: 3.9 MMOL/L (ref 3.5–5.3)
PROT SERPL-MCNC: 6.5 G/DL (ref 6.4–8.2)
RBC # BLD AUTO: 4.49 X10*6/UL (ref 4–5.2)
SODIUM SERPL-SCNC: 137 MMOL/L (ref 136–145)
WBC # BLD AUTO: 12.4 X10*3/UL (ref 4.4–11.3)

## 2023-12-06 PROCEDURE — 36415 COLL VENOUS BLD VENIPUNCTURE: CPT

## 2023-12-06 PROCEDURE — 74176 CT ABD & PELVIS W/O CONTRAST: CPT

## 2023-12-06 PROCEDURE — 85025 COMPLETE CBC W/AUTO DIFF WBC: CPT

## 2023-12-06 PROCEDURE — 74176 CT ABD & PELVIS W/O CONTRAST: CPT | Performed by: RADIOLOGY

## 2023-12-06 PROCEDURE — 86140 C-REACTIVE PROTEIN: CPT

## 2023-12-06 PROCEDURE — 80053 COMPREHEN METABOLIC PANEL: CPT

## 2023-12-06 PROCEDURE — 1036F TOBACCO NON-USER: CPT | Performed by: INTERNAL MEDICINE

## 2023-12-06 PROCEDURE — 99214 OFFICE O/P EST MOD 30 MIN: CPT | Performed by: INTERNAL MEDICINE

## 2023-12-06 NOTE — PATIENT INSTRUCTIONS
We will obtain stat CT abdomen pelvis along with stat lab work for evaluation of abdominal pain    If your abdominal pain worsens or you develop worsening nausea vomiting recommend presenting to the emergency room for evaluation

## 2023-12-06 NOTE — PROGRESS NOTES
[facility]    ASSESSMENT and PLAN:       Ryann Kuhn is a 38 y.o. female with a significant past medical history of crohns disese  who presents for consultation requested by her primary care provider (Mehul Suarez DO) for the evaluation of abdominal.       1. Mild to moderate Crohn's disease  -Patient states she was diagnosed at the age of 7  -Denies any previous surgical intervention  -Main area of active disease seems to be in the distal terminal ileum with mild stricture seen possible mild inflammation seen on CTE  -Patient has failed Humira and Remicade in the past due to side effects  -She also states she has been on AZA  -Patient is currently maintained on Simponi for her rheumatologist at at [facility]  - RECORDS reviewed from [facility] pt with inflammatory arthritis from her IBD  -Patient at this time declines EGD and colonoscopy for evaluation   - Will obtain stat ct abdomen and pelvis for evaluation of patients abdominal pain and to eval for SBO with hx mild TI stricture   - Will obtain CRP CBC with diff and CMP   - Will contact patient once ct abdomen  is obtained   -Consider Rinvoq vs Skyrizzz as this is just approved for Crohn's disease and has coverage of arthritis            Yusuf Vasquez DO         Gastroenterology    [facility]            Subjective   HISTORY OF PRESENT ILLNESS:     Chief Complaint  Abdominal Pain (Started prednisone taper - not helping )    History Of Present Illness:    Ryann Kuhn is a 38 y.o. female with a significant past medical history of Crohn's disease who presents for consultation requested by her primary care provider (Mehul Suarez DO) for the evaluation of..     She admits to some abdominal pain and the pain is worse with eating and food. It started about 1 weeks prior. She dnies any blood in her stool or diarrhea.    She admits to some constipation intermitty        Patient denies  any heartburn/GERD, N/V, dysphagia, odynophagia, abdominal pain, diarrhea, constipation, hematemesis, hematochezia, melena, or weight loss.      Endoscopy History:  - EGD 2015: normal stomach, esophagus, and duodenum . Pathology showed no abnormality   - Colonoscopy 2015: normal TI and colon. Biopsies negative for colitis       Review of systems:   Review of Systems      I performed a complete 10 point review of systems and it is negative except as noted in HPI or above.        PAST HISTORIES:       Past Medical History:  She has a past medical history of Disease of intestine, unspecified, Personal history of diseases of the blood and blood-forming organs and certain disorders involving the immune mechanism, Personal history of gestational diabetes, Personal history of other diseases of the digestive system, Personal history of other diseases of the musculoskeletal system and connective tissue, Personal history of other diseases of the musculoskeletal system and connective tissue, Personal history of other diseases of the nervous system and sense organs, Personal history of other mental and behavioral disorders, and Personal history of other specified conditions (2019).    Past Surgical History:  She has a past surgical history that includes Tubal ligation (2018);  section, classic (2018); and Other surgical history (2018).      Social History:  She reports that she has never smoked. She has never been exposed to tobacco smoke. She has never used smokeless tobacco. She reports that she does not drink alcohol and does not use drugs.    Family History:  No known GI disease, specifically denies pancreatitis, Crohn's, colon cancer, gastroesophageal cancer, or ulcerative colitis.    Family History   Problem Relation Name Age of Onset    Cervical cancer Mother      Other (Cerebrovascular Accident) Father      Congenital heart disease Father      Coronary artery disease Father      Lung  cancer Father      Heart attack Father      Hyperlipidemia Father      Hypertension Father      Diabetes Paternal Grandmother      Lung cancer Paternal Grandmother      Diabetes Paternal Grandfather      Colon cancer Paternal Grandfather          Allergies:  Azithromycin, Doxycycline, Dyclonine, Nsaids (non-steroidal anti-inflammatory drug), and Sulfa (sulfonamide antibiotics)        Objective   OBJECTIVE:       Last Recorded Vitals:  There were no vitals filed for this visit.  There were no vitals taken for this visit.     Physical Exam:    Physical Exam  Vitals reviewed.   Constitutional:       General: She is awake.      Appearance: Normal appearance.   HENT:      Head: Normocephalic and atraumatic.      Mouth/Throat:      Mouth: Mucous membranes are moist.   Eyes:      Extraocular Movements: Extraocular movements intact.   Cardiovascular:      Rate and Rhythm: Normal rate.      Heart sounds: Normal heart sounds.   Pulmonary:      Effort: Pulmonary effort is normal.      Breath sounds: Normal breath sounds.   Abdominal:      General: Bowel sounds are normal. There is no distension.      Palpations: Abdomen is soft. There is no mass.      Tenderness: There is no abdominal tenderness. There is guarding (voluntary guarding is present). There is no rebound.      Hernia: No hernia is present.   Musculoskeletal:         General: Normal range of motion.      Cervical back: Neck supple.   Skin:     General: Skin is warm and dry.   Neurological:      General: No focal deficit present.      Mental Status: She is alert.   Psychiatric:         Attention and Perception: Attention and perception normal.         Mood and Affect: Mood normal.         Behavior: Behavior normal.             Home Medications:  Prior to Admission medications    Medication Sig Start Date End Date Taking? Authorizing Provider   aspirin 81 mg EC tablet Take 1 tablet (81 mg) by mouth once daily. Delayed Release 3/9/21   Historical Provider, MD    cholecalciferol (Vitamin D-3) 1,250 mcg (50,000 unit) capsule Take 1 capsule (50,000 Units) by mouth 1 (one) time per week. 11/18/20   Historical Provider, MD   cyclobenzaprine (Flexeril) 10 mg tablet Take 1 tablet (10 mg) by mouth 2 times a day. 8/21/23 8/20/24  Mehul Suarez DO   DANDELION ROOT ORAL Dandelion Root CAPS   Refills: 0       Active    Historical Provider, MD   golimumab (Simponi) 50 mg/0.5 mL syringe Simponi 50 MG/0.5ML Subcutaneous Solution Prefilled Syringe   Refills: 0        Start : 27-Jul-2021   Active  0.5 ML Syringe 7/27/21   Historical Provider, MD   hydrOXYzine HCL (Atarax) 25 mg tablet Take 1 tablet (25 mg) by mouth 3 times a day. 8/21/23 12/19/23  Mehul Suarez DO   LORazepam (Ativan) 1 mg tablet Take 1 tablet (1 mg) by mouth every 8 hours if needed for anxiety for up to 6 days. 8/21/23 8/27/23  Mehul Suarez DO   magnesium oxide (Mag-Ox) 400 mg (241.3 mg magnesium) tablet Take 1 tablet (400 mg) by mouth in the morning and 1 tablet (400 mg) before bedtime. 3/3/21   Historical Provider, MD   naproxen (Naprosyn) 500 mg tablet Take 1 tablet (500 mg) by mouth 2 times a day as needed for mild pain (1 - 3) (pain). 8/21/23 4/17/24  Mehul Suarez DO   predniSONE (Deltasone) 10 mg tablet Take 4 tablets (40 mg) by mouth once daily for 7 days, THEN 3 tablets (30 mg) once daily for 7 days, THEN 2 tablets (20 mg) once daily for 7 days, THEN 1 tablet (10 mg) once daily for 7 days. 11/30/23 12/27/23  Yusuf Vasquez DO   Isreal's wort 300 mg capsule Take 1 capsule by mouth in the morning and 1 capsule before bedtime. 3/30/22   Historical Provider, MD   TURMERIC ORAL Turmeric Curcumin Oral Capsule; 2000 per day    Historical Provider, MD         Relevant Results Recent labs reviewed in the EMR.  Lab Results   Component Value Date    HGB 12.3 05/17/2023    HGB 13.3 05/16/2023    HGB 12.9 03/06/2023    MCV 89 05/17/2023    MCV 91 05/16/2023    MCV 91 03/06/2023     05/17/2023      05/16/2023     03/06/2023       Lab Results   Component Value Date    FERRITIN 34 03/06/2023    IRON 86 03/06/2023       Lab Results   Component Value Date     05/17/2023    K 3.6 05/17/2023     05/17/2023    BUN 10 05/17/2023    CREATININE 0.60 05/17/2023       Lab Results   Component Value Date    BILITOT 0.3 05/17/2023     Lab Results   Component Value Date    ALT 9 05/17/2023    ALT 12 05/16/2023    ALT 11 03/06/2023    AST 10 05/17/2023    AST 12 05/16/2023    AST 13 03/06/2023    ALKPHOS 43 05/17/2023    ALKPHOS 47 05/16/2023    ALKPHOS 48 03/06/2023       Lab Results   Component Value Date    CRP <0.10 03/06/2023       Calprotectin, Stool   Date Value Ref Range Status   03/06/2023 CANCELED       Comment:     Result canceled by the ancillary.       Radiology: Reviewed imaging reviewed in the EMR.  EMG & nerve conduction    Result Date: 11/24/2023  IMPRESSION This is an abnormal study with the following findings: 1. 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 CTS) with no active denervation 2. Abnormal needle findings suggest mild, chronic, left lower cervical radiculopathy with no active denervation There is no electrodiagnostic evidence of left median neuropathy, right ulnar neuropathy, right brachial plexopathy, right lower cervical radiculopathy and/or myopathy at this time. Clinical correlation recommended. Procedure attestation: As the attending physician, I attest to the following: Pain was assessed and reassessed during the EMG, and managed with appropriate intervention throughout the entire procedure. This report has been interpreted and electronically signed by

## 2023-12-07 ENCOUNTER — TELEPHONE (OUTPATIENT)
Dept: PRIMARY CARE | Facility: CLINIC | Age: 38
End: 2023-12-07
Payer: COMMERCIAL

## 2023-12-07 ENCOUNTER — TELEPHONE (OUTPATIENT)
Dept: GASTROENTEROLOGY | Facility: CLINIC | Age: 38
End: 2023-12-07
Payer: COMMERCIAL

## 2023-12-07 NOTE — TELEPHONE ENCOUNTER
----- Message from Yusuf Vasquez DO sent at 12/6/2023  3:27 PM EST -----  Patients ct does not show any acute GI problems and her lab work does not show any acute crohn's flare. She does a cyst in her ovary which maybe causing her pain. She should call her OBGYN or PCP to address the ovarian cyst that was seen   ----- Message -----  From: Interface, Radiology Results In  Sent: 12/6/2023   3:25 PM EST  To: Yusuf Vasquez DO

## 2023-12-07 NOTE — TELEPHONE ENCOUNTER
MUSTAPHA Meier, DO; [id number] Clinical Support Staff  Pt called for results and was advised.  Pt states that she wants to know if the possibility of a loop in her intestine could be causing her issues with going to the bathroom.  She states that the lesion on her liver has grown in size and wants to talk to someone about that as well.  She states she is afraid that because the CT was w/o contrast, that something might be getting missed.

## 2023-12-11 ENCOUNTER — TELEPHONE (OUTPATIENT)
Dept: GASTROENTEROLOGY | Facility: CLINIC | Age: 38
End: 2023-12-11
Payer: COMMERCIAL

## 2023-12-11 NOTE — TELEPHONE ENCOUNTER
Pt is scheduled for a follow up with you on 1/9 for ER follow up from [facility].  Pt wanted to know if you could switch her medication to Skyrezi before then because she doesn't think the other medication is doing anything.

## 2023-12-13 ENCOUNTER — TELEMEDICINE (OUTPATIENT)
Dept: PRIMARY CARE | Facility: CLINIC | Age: 38
End: 2023-12-13
Payer: COMMERCIAL

## 2023-12-13 DIAGNOSIS — K50.018 CROHN'S DISEASE OF SMALL INTESTINE WITH OTHER COMPLICATION (MULTI): ICD-10-CM

## 2023-12-13 DIAGNOSIS — R14.0 ABDOMINAL BLOATING: Primary | ICD-10-CM

## 2023-12-13 PROBLEM — Z76.5 DRUG-SEEKING BEHAVIOR: Status: ACTIVE | Noted: 2023-12-13

## 2023-12-13 PROBLEM — R55 NEAR SYNCOPE: Status: ACTIVE | Noted: 2023-12-13

## 2023-12-13 PROBLEM — M79.7 FIBROMYALGIA: Status: ACTIVE | Noted: 2023-12-13

## 2023-12-13 PROBLEM — R10.9 ABDOMINAL PAIN, ACUTE: Status: ACTIVE | Noted: 2023-12-13

## 2023-12-13 PROCEDURE — 99443 PR PHYS/QHP TELEPHONE EVALUATION 21-30 MIN: CPT | Performed by: FAMILY MEDICINE

## 2023-12-13 RX ORDER — DULOXETIN HYDROCHLORIDE 20 MG/1
CAPSULE, DELAYED RELEASE ORAL
COMMUNITY
End: 2024-01-09 | Stop reason: SINTOL

## 2023-12-13 RX ORDER — FAMOTIDINE 20 MG/1
TABLET, FILM COATED ORAL
COMMUNITY
Start: 2023-12-10

## 2023-12-13 RX ORDER — GABAPENTIN 100 MG/1
CAPSULE ORAL
COMMUNITY

## 2023-12-13 RX ORDER — METOPROLOL TARTRATE 25 MG/1
TABLET, FILM COATED ORAL
COMMUNITY

## 2023-12-13 RX ORDER — MUPIROCIN CALCIUM 20 MG/G
CREAM TOPICAL
COMMUNITY

## 2023-12-13 RX ORDER — INFLIXIMAB 100 MG/10ML
INJECTION, POWDER, LYOPHILIZED, FOR SOLUTION INTRAVENOUS
COMMUNITY

## 2023-12-13 ASSESSMENT — ENCOUNTER SYMPTOMS
CONSTIPATION: 1
ARTHRALGIAS: 1
DYSURIA: 0
WEIGHT LOSS: 0
HEMATURIA: 0
NAUSEA: 1
MYALGIAS: 1
DIARRHEA: 0
HEMATOCHEZIA: 1
ANOREXIA: 1
FLATUS: 0
FREQUENCY: 1
FEVER: 0
VOMITING: 1
BELCHING: 1
ABDOMINAL PAIN: 1
HEADACHES: 1

## 2023-12-13 NOTE — PROGRESS NOTES
Subjective   Patient ID: Ryann Kuhn is a 38 y.o. female who presents for blood in bowels x 1 week and pain  Abdominal Pain  This is a new problem. The current episode started more than 1 month ago. The onset quality is sudden. The problem occurs 2 to 4 times per day. The most recent episode lasted 2 hours. The problem has been rapidly worsening. The pain is located in the LLQ, LUQ, epigastric region, periumbilical region and left flank. The pain is at a severity of 10/10. The quality of the pain is burning, cramping, a sensation of fullness, sharp and tearing. The abdominal pain radiates to the LLQ, back, left flank and pelvis. Associated symptoms include anorexia, arthralgias, belching, constipation, frequency, headaches, hematochezia, myalgias, nausea and vomiting. Pertinent negatives include no diarrhea, dysuria, fever, flatus, hematuria, melena or weight loss. The pain is aggravated by eating, movement, urination and vomiting. The pain is relieved by Nothing, being still, bowel movements, certain positions and recumbency. Prior diagnostic workup includes CT scan and GI consult.     Saw Dr Marcum and given steroid taper.  She had her infusion and no pain.  No period for 2 months.    Saw GYN and told pain is not from fibroids.    Having bloating and digestion is moving slow.  Pain in low back around the side to the ribs and abd.      Frequent urination.  Drinking a lot of cranberry juice.     BM 3-4 days.    Review of Systems   Constitutional:  Negative for fever and weight loss.   Gastrointestinal:  Positive for abdominal pain, anorexia, constipation, hematochezia, nausea and vomiting. Negative for diarrhea, flatus and melena.   Genitourinary:  Positive for frequency. Negative for dysuria and hematuria.   Musculoskeletal:  Positive for arthralgias and myalgias.   Neurological:  Positive for headaches.       Objective   Physical Exam    Assessment/Plan   Problem List Items Addressed This Visit     None  Discussed with the patient as I reviewed ER record, labs, CAT scan with the different possibilities were  She is following with gastroenterology.  Refused colonoscopy due to previous reactions when having colonoscopy  Discussed the possibility of small intestinal bowel overgrowth and treating with Xifaxan    Time on the phone was 21 minutes

## 2023-12-14 NOTE — TELEPHONE ENCOUNTER
PT CALLED IN - WANTED TO KNOW ABOUT THE SKYREZI SCRIIPT - PT STATES THAT HER PCP AND RHEUMATOLOGIST BOUT THIS IT IS A GOOD IDEA.  ALSO, HER PCP BELIEVES THAT ALL OF HER ISSUES ARE BEING CAUSED BY AN INFECTION AND THINK SHE NEEDS A ANTIBIOTIC FROM YOU.  PLEASE ADVISE.

## 2023-12-15 ENCOUNTER — TELEPHONE (OUTPATIENT)
Dept: PRIMARY CARE | Facility: CLINIC | Age: 38
End: 2023-12-15
Payer: COMMERCIAL

## 2023-12-15 NOTE — TELEPHONE ENCOUNTER
Pt states that the pain is getting worse, she can barely stand up, can hardly move.  Final physician results of the CT, changed, done at [facility]  She states that you talked about prescribing her an antibiotic??  [facility]

## 2023-12-18 ENCOUNTER — TELEPHONE (OUTPATIENT)
Dept: PRIMARY CARE | Facility: CLINIC | Age: 38
End: 2023-12-18
Payer: COMMERCIAL

## 2023-12-19 ENCOUNTER — TELEPHONE (OUTPATIENT)
Dept: PRIMARY CARE | Facility: CLINIC | Age: 38
End: 2023-12-19
Payer: COMMERCIAL

## 2023-12-19 NOTE — TELEPHONE ENCOUNTER
Patient called and stated she was wanting to go on antibiotic you had discussed with her. She is still feeling rather poorly with fevers off and on and issues with vomiting

## 2023-12-20 NOTE — TELEPHONE ENCOUNTER
Ryann called today. She is in so much pain intense. Stomach pain in lower stomach goes around into her back. Can't eat or hardly walk around due to pain. Weak and just feels out of it things aren't registering when people are talking to her. Temperatures have been running around 100-101.5. Going for blood cultures this morning in [place]. She  was exposed to someone with [facility] and being checked for that she states.  Wants an antibiotic even if it is broad spectrum and now also asking for refill of naproxyn and ativan to see if this can help her cope.  States  ready to leave her because of this and children asking Safia to make her better.  Wanted you to know this. Please call her. I can put on for virtual if you would like.

## 2024-01-09 ENCOUNTER — TELEMEDICINE (OUTPATIENT)
Dept: GASTROENTEROLOGY | Facility: CLINIC | Age: 39
End: 2024-01-09
Payer: COMMERCIAL

## 2024-01-09 ENCOUNTER — OFFICE VISIT (OUTPATIENT)
Dept: PRIMARY CARE | Facility: CLINIC | Age: 39
End: 2024-01-09
Payer: COMMERCIAL

## 2024-01-09 VITALS
WEIGHT: 135 LBS | BODY MASS INDEX: 26.5 KG/M2 | HEIGHT: 60 IN | TEMPERATURE: 98 F | SYSTOLIC BLOOD PRESSURE: 118 MMHG | DIASTOLIC BLOOD PRESSURE: 78 MMHG | HEART RATE: 82 BPM | OXYGEN SATURATION: 99 %

## 2024-01-09 DIAGNOSIS — M25.541 ARTHRALGIA OF BOTH HANDS: ICD-10-CM

## 2024-01-09 DIAGNOSIS — M54.9 CHRONIC BACK PAIN, UNSPECIFIED BACK LOCATION, UNSPECIFIED BACK PAIN LATERALITY: ICD-10-CM

## 2024-01-09 DIAGNOSIS — M25.542 ARTHRALGIA OF BOTH HANDS: ICD-10-CM

## 2024-01-09 DIAGNOSIS — F41.9 ANXIETY: ICD-10-CM

## 2024-01-09 DIAGNOSIS — R10.9 ABDOMINAL PAIN, ACUTE: ICD-10-CM

## 2024-01-09 DIAGNOSIS — K50.018 CROHN'S DISEASE OF SMALL INTESTINE WITH OTHER COMPLICATION (MULTI): ICD-10-CM

## 2024-01-09 DIAGNOSIS — F41.0 PANIC ATTACK AS REACTION TO STRESS: ICD-10-CM

## 2024-01-09 DIAGNOSIS — F43.0 PANIC ATTACK AS REACTION TO STRESS: ICD-10-CM

## 2024-01-09 DIAGNOSIS — G89.29 CHRONIC BACK PAIN, UNSPECIFIED BACK LOCATION, UNSPECIFIED BACK PAIN LATERALITY: ICD-10-CM

## 2024-01-09 DIAGNOSIS — R53.83 FATIGUE, UNSPECIFIED TYPE: ICD-10-CM

## 2024-01-09 DIAGNOSIS — M79.7 FIBROMYALGIA: ICD-10-CM

## 2024-01-09 DIAGNOSIS — Z00.00 MEDICARE ANNUAL WELLNESS VISIT, SUBSEQUENT: Primary | ICD-10-CM

## 2024-01-09 DIAGNOSIS — R10.33 PERIUMBILICAL ABDOMINAL PAIN: ICD-10-CM

## 2024-01-09 PROCEDURE — 99214 OFFICE O/P EST MOD 30 MIN: CPT | Performed by: INTERNAL MEDICINE

## 2024-01-09 PROCEDURE — G0439 PPPS, SUBSEQ VISIT: HCPCS | Performed by: FAMILY MEDICINE

## 2024-01-09 PROCEDURE — 99214 OFFICE O/P EST MOD 30 MIN: CPT | Performed by: FAMILY MEDICINE

## 2024-01-09 PROCEDURE — 1036F TOBACCO NON-USER: CPT | Performed by: FAMILY MEDICINE

## 2024-01-09 RX ORDER — METOCLOPRAMIDE 5 MG/1
5 TABLET ORAL 4 TIMES DAILY
COMMUNITY

## 2024-01-09 RX ORDER — LORAZEPAM 1 MG/1
1 TABLET ORAL EVERY 8 HOURS PRN
Qty: 18 TABLET | Refills: 0 | Status: CANCELLED | OUTPATIENT
Start: 2024-01-09 | End: 2024-01-15

## 2024-01-09 ASSESSMENT — ACTIVITIES OF DAILY LIVING (ADL)
TAKING_MEDICATION: INDEPENDENT
DOING_HOUSEWORK: INDEPENDENT
BATHING: INDEPENDENT
DRESSING: INDEPENDENT
GROCERY_SHOPPING: NEEDS ASSISTANCE
MANAGING_FINANCES: INDEPENDENT

## 2024-01-09 ASSESSMENT — ENCOUNTER SYMPTOMS
FACIAL ASYMMETRY: 0
APPETITE CHANGE: 1
EYE PAIN: 1
DIZZINESS: 1
WEAKNESS: 1
CONFUSION: 1
ACTIVITY CHANGE: 1
FATIGUE: 1
SHORTNESS OF BREATH: 0
DIARRHEA: 1
UNEXPECTED WEIGHT CHANGE: 1
ABDOMINAL PAIN: 1
DECREASED CONCENTRATION: 1
NAUSEA: 1
SORE THROAT: 0
LIGHT-HEADEDNESS: 1
COUGH: 0
DYSPHORIC MOOD: 0
ANAL BLEEDING: 1
WHEEZING: 0
NERVOUS/ANXIOUS: 1
CONSTIPATION: 1
VOMITING: 0
EYE DISCHARGE: 1
SINUS PAIN: 0
SINUS PRESSURE: 0
PALPITATIONS: 1
HEADACHES: 0
SLEEP DISTURBANCE: 1
EYE ITCHING: 1
CHEST TIGHTNESS: 1
RHINORRHEA: 0

## 2024-01-09 ASSESSMENT — PATIENT HEALTH QUESTIONNAIRE - PHQ9
10. IF YOU CHECKED OFF ANY PROBLEMS, HOW DIFFICULT HAVE THESE PROBLEMS MADE IT FOR YOU TO DO YOUR WORK, TAKE CARE OF THINGS AT HOME, OR GET ALONG WITH OTHER PEOPLE: VERY DIFFICULT
1. LITTLE INTEREST OR PLEASURE IN DOING THINGS: NEARLY EVERY DAY
2. FEELING DOWN, DEPRESSED OR HOPELESS: NEARLY EVERY DAY
SUM OF ALL RESPONSES TO PHQ9 QUESTIONS 1 AND 2: 6
1. LITTLE INTEREST OR PLEASURE IN DOING THINGS: SEVERAL DAYS
10. IF YOU CHECKED OFF ANY PROBLEMS, HOW DIFFICULT HAVE THESE PROBLEMS MADE IT FOR YOU TO DO YOUR WORK, TAKE CARE OF THINGS AT HOME, OR GET ALONG WITH OTHER PEOPLE: VERY DIFFICULT
2. FEELING DOWN, DEPRESSED OR HOPELESS: SEVERAL DAYS
SUM OF ALL RESPONSES TO PHQ9 QUESTIONS 1 AND 2: 2

## 2024-01-09 NOTE — PROGRESS NOTES
Subjective   Reason for Visit: Ryann Kuhn is an 38 y.o. female here for a Medicare Wellness visit.          Reviewed all medications by prescribing practitioner or clinical pharmacist (such as prescriptions, OTCs, herbal therapies and supplements) and documented in the medical record.    HPI    Ryann Kuhn is an 38 y.o. female here for a Medicare Wellness visit.  She has a history of severe spinal degenerative disease. She was referred to follow up with pain management at the [facility] but hasn't made an appointment yet. Is still having some chronic pain. Also complaining of bilaterally thumb pain, her left foot is still swelling from time to time.     Reviewed Chronic conditions and medications    New concerns: Wants to review and discuss the results of the recent imaging at [facility] for the severe spinal degenerative disease.    Dentist: not currently  Eye Exams: Regularly  Diet: Well balanced, but decreased appetite  Exercise: not currently doing any exercise   Caffeine intake: 1.5 pots of coffee a day  Water intake: sufficient but could use some work    Alcohol use: 10-12 glasses of wine a week, but has stopped completely since the severe spinal degenerative disease was diagnosed  Tobacco: none  Illicit Drugs: denies  Medical marijuana card- takes it nightly      Patient Care Team:  Mehul Suarez DO as PCP - General  Yusuf Vasquez DO as PCP - [employer] PCP     Review of Systems   Constitutional:  Positive for activity change, appetite change, fatigue and unexpected weight change.   HENT:  Positive for congestion. Negative for postnasal drip, rhinorrhea, sinus pressure, sinus pain and sore throat.    Eyes:  Positive for pain, discharge and itching.   Respiratory:  Positive for chest tightness. Negative for cough, shortness of breath and wheezing.    Cardiovascular:  Positive for chest pain and palpitations.   Gastrointestinal:  Positive for abdominal pain,  anal bleeding, constipation, diarrhea and nausea. Negative for vomiting.   Neurological:  Positive for dizziness, weakness and light-headedness. Negative for facial asymmetry and headaches.   Psychiatric/Behavioral:  Positive for behavioral problems, confusion, decreased concentration and sleep disturbance. Negative for dysphoric mood, self-injury and suicidal ideas. The patient is nervous/anxious.        Objective   Vitals:  /78 (BP Location: Right arm, Patient Position: Sitting, BP Cuff Size: Small adult)   Pulse 82   Temp 36.7 °C (98 °F)   Ht 1.524 m (5')   Wt 61.2 kg (135 lb)   SpO2 99%   BMI 26.37 kg/m²       Physical Exam    CONSTITUTIONAL - well nourished, well developed, looks like stated age, in no acute distress, not ill-appearing, and not tired appearing  SKIN - normal skin color and pigmentation, normal skin turgor without rash, lesions, or nodules visualized  HEAD - no trauma, normocephalic  EYES - pupils are equal and reactive to light, extraocular muscles are intact, and normal external exam  ENT -uvula midline, normal tongue movement and throat normal  NECK - supple without rigidity, no neck mass was observed, no thyromegaly or thyroid nodules  CHEST - clear to auscultation, no wheezing, no crackles and no rales, good effort  CARDIAC - regular rate and regular rhythm, no skipped beats, no murmur  ABDOMEN - no organomegaly, soft, nontender, nondistended, normal bowel sounds, no guarding/rebound/rigidity  EXTREMITIES - no edema, no deformities  NEUROLOGICAL - normal gait, normal balance, normal motor, no ataxia  PSYCHIATRIC - alert, pleasant and cordial, age-appropriate  IMMUNOLOGIC - no cervical lymphadenopathy       Assessment/Plan   Problem List Items Addressed This Visit       Anxiety    Crohn's disease (CMS/HCC)    Fatigue    Joint pain    Abdominal pain, acute    Fibromyalgia    Periumbilical abdominal pain     Other Visit Diagnoses       Medicare annual wellness visit, subsequent     -  Primary    Panic attack as reaction to stress        Chronic back pain, unspecified back location, unspecified back pain laterality              Pain formula topical from [employer].   Need to follow up with pain management.  Do not take flexeril and hydroxyzine together due to sensation.     Has referral to several specialists but she cannot get to them due to transportation issues.      She will follow up with [facility] for the back

## 2024-01-09 NOTE — PROGRESS NOTES
[facility] Gastroenterology    ASSESSMENT and PLAN:       Ryann Kuhn is a 38 y.o. female with a significant past medical history of crohns disese  who presents for consultation requested by her primary care provider (Mehul Suarez DO) for the evaluation of abdominal.       1. Mild to moderate Crohn's disease  -Patient states she was diagnosed at the age of 7  -Denies any previous surgical intervention  -Main area of active disease seems to be in the distal terminal ileum with mild stricture seen possible mild inflammation seen on CTE  -Patient has failed Humira and Remicade in the past due to side effects  -She also states she has been on AZA  -Patient is currently maintained on Simponi from her rheumatologist at at [facility]  - RECORDS reviewed from [facility] pt with inflammatory arthritis from her IBD  -Patient at this time declines EGD and colonoscopy for evaluation   - Underwent recent CTE at [facility] that did not show any active crohns disease   - Infection workup for fungal infection and TB also negative at [facility]  -Continue with Simponi from [facility] for inflammatory arthritis as there is no signs of active crohns disease on CTE and with normal CRP and fecal loki protectin done at [facility]   -Consider Rinvoq vs Skyrizzz if need arrives for class switch      Patient to follow up in 6 months       Yusuf Vasquez DO         Gastroenterology    [facility]            Subjective   HISTORY OF PRESENT ILLNESS:     Chief Complaint  Follow-up ([facility] 12/8/23)    History Of Present Illness:    Ryann Kuhn is a 38 y.o. female with a significant past medical history of Crohn's disease who presents for follow up via vitural visit.     She is feeling better. She is not having any more abdominal pain. She denes any diarrhea or change in bowel habits.  Patient's lab work along with imaging and office notes from [facility] was extensively reviewed.  Overall  she states she feels much improved no longer having abdominal pain.  She states that she feels that her pain is from her lower back.  She is currently undergoing evaluation at [facility].          Patient denies any heartburn/GERD, N/V, dysphagia, odynophagia, abdominal pain, diarrhea, constipation, hematemesis, hematochezia, melena, or weight loss.      Endoscopy History:  - EGD 2015: normal stomach, esophagus, and duodenum . Pathology showed no abnormality   - Colonoscopy 2015: normal TI and colon. Biopsies negative for colitis       Review of systems:   Review of Systems      I performed a complete 10 point review of systems and it is negative except as noted in HPI or above.        PAST HISTORIES:       Past Medical History:  She has a past medical history of Disease of intestine, unspecified, Personal history of diseases of the blood and blood-forming organs and certain disorders involving the immune mechanism, Personal history of gestational diabetes, Personal history of other diseases of the digestive system, Personal history of other diseases of the musculoskeletal system and connective tissue, Personal history of other diseases of the musculoskeletal system and connective tissue, Personal history of other diseases of the nervous system and sense organs, Personal history of other mental and behavioral disorders, and Personal history of other specified conditions (2019).    Past Surgical History:  She has a past surgical history that includes Tubal ligation (2018);  section, classic (2018); and Other surgical history (2018).      Social History:  She reports that she has never smoked. She has never been exposed to tobacco smoke. She has never used smokeless tobacco. She reports that she does not drink alcohol and does not use drugs.    Family History:  No known GI disease, specifically denies pancreatitis, Crohn's, colon cancer, gastroesophageal cancer, or ulcerative  colitis.    Family History   Problem Relation Name Age of Onset    Cervical cancer Mother      Other (Cerebrovascular Accident) Father      Congenital heart disease Father      Coronary artery disease Father      Lung cancer Father      Heart attack Father      Hyperlipidemia Father      Hypertension Father      Diabetes Paternal Grandmother      Lung cancer Paternal Grandmother      Diabetes Paternal Grandfather      Colon cancer Paternal Grandfather          Allergies:  Iodinated contrast media, Acetaminophen, Azithromycin, Cephalexin monohydrate, Doxycycline, Dyclonine, Hydrocodone bitartrate, Iodine, Mercaptopurine, Mesalamine, Milk, Nsaids (non-steroidal anti-inflammatory drug), and Sulfa (sulfonamide antibiotics)        Objective   OBJECTIVE:       Last Recorded Vitals:  There were no vitals filed for this visit.  There were no vitals taken for this visit.     Physical Exam:    Physical Exam  Constitutional:       Appearance: Normal appearance.   HENT:      Head: Normocephalic.   Eyes:      Extraocular Movements: Extraocular movements intact.   Neurological:      Mental Status: She is alert.             Home Medications:  Prior to Admission medications    Medication Sig Start Date End Date Taking? Authorizing Provider   aspirin 81 mg EC tablet Take 1 tablet (81 mg) by mouth once daily. Delayed Release 3/9/21   Historical Provider, MD   cholecalciferol (Vitamin D-3) 1,250 mcg (50,000 unit) capsule Take 1 capsule (50,000 Units) by mouth 1 (one) time per week. 11/18/20   Historical Provider, MD   cyclobenzaprine (Flexeril) 10 mg tablet Take 1 tablet (10 mg) by mouth 2 times a day. 8/21/23 8/20/24  Mehul Suarez,    DANDELION ROOT ORAL Dandelion Root CAPS   Refills: 0       Active    Historical Provider, MD   golimumab (Simponi) 50 mg/0.5 mL syringe Simponi 50 MG/0.5ML Subcutaneous Solution Prefilled Syringe   Refills: 0        Start : 27-Jul-2021   Active  0.5 ML Syringe 7/27/21   Historical Provider, MD    hydrOXYzine HCL (Atarax) 25 mg tablet Take 1 tablet (25 mg) by mouth 3 times a day. 8/21/23 12/19/23  Mehul Suarez DO   LORazepam (Ativan) 1 mg tablet Take 1 tablet (1 mg) by mouth every 8 hours if needed for anxiety for up to 6 days. 8/21/23 8/27/23  Mehul Suarez DO   magnesium oxide (Mag-Ox) 400 mg (241.3 mg magnesium) tablet Take 1 tablet (400 mg) by mouth in the morning and 1 tablet (400 mg) before bedtime. 3/3/21   Historical Provider, MD   naproxen (Naprosyn) 500 mg tablet Take 1 tablet (500 mg) by mouth 2 times a day as needed for mild pain (1 - 3) (pain). 8/21/23 4/17/24  Mehul Suarez DO   predniSONE (Deltasone) 10 mg tablet Take 4 tablets (40 mg) by mouth once daily for 7 days, THEN 3 tablets (30 mg) once daily for 7 days, THEN 2 tablets (20 mg) once daily for 7 days, THEN 1 tablet (10 mg) once daily for 7 days. 11/30/23 12/27/23  Yusuf Vasquez,    [place]'s wort 300 mg capsule Take 1 capsule by mouth in the morning and 1 capsule before bedtime. 3/30/22   Historical Provider, MD   TURMERIC ORAL Turmeric Curcumin Oral Capsule; 2000 per day    Historical Provider, MD         Relevant Results Recent labs reviewed in the EMR.  Lab Results   Component Value Date    HGB 12.9 12/06/2023    HGB 12.3 05/17/2023    HGB 13.3 05/16/2023    HGB 12.9 03/06/2023    MCV 94 12/06/2023    MCV 89 05/17/2023    MCV 91 05/16/2023    MCV 91 03/06/2023     (H) 12/06/2023     05/17/2023     05/16/2023     03/06/2023       Lab Results   Component Value Date    FERRITIN 34 03/06/2023    IRON 86 03/06/2023       Lab Results   Component Value Date     12/06/2023    K 3.9 12/06/2023     12/06/2023    BUN 11 12/06/2023    CREATININE 0.66 12/06/2023       Lab Results   Component Value Date    BILITOT 0.2 12/06/2023     Lab Results   Component Value Date    ALT 10 12/06/2023    ALT 9 05/17/2023    ALT 12 05/16/2023    ALT 11 03/06/2023    AST 9 12/06/2023    AST 10 05/17/2023    AST  12 05/16/2023    AST 13 03/06/2023    ALKPHOS 45 12/06/2023    ALKPHOS 43 05/17/2023    ALKPHOS 47 05/16/2023    ALKPHOS 48 03/06/2023       Lab Results   Component Value Date    CRP <0.10 12/06/2023    CRP <0.10 03/06/2023       Calprotectin, Stool   Date Value Ref Range Status   03/06/2023 CANCELED       Comment:     Result canceled by the ancillary.       Radiology: Reviewed imaging reviewed in the EMR.      DATE OF EXAM: Dec  8 2023  5:00PM      [facility]   0545  -  CT ENTEROGRAPHY W IVCON  / ACCESSION #  [id number]     PROCEDURE REASON: Crohn's exacerbation          * * * * Physician Interpretation * * * *      EXAMINATION: CT ABDOMEN AND PELVIS WITH INTRAVENOUS CONTRAST AND NEUTRAL   ORAL CONTRAST(CT ENTEROGRAPHY)     HISTORY: Crohn's disease with suspected exacerbation.  Patient presents   to the emergency department with abdominal pain, nausea.     TECHNIQUE: CT of the abdomen and pelvis using single  phase Enterography   technique     CONTRAST:   IV: 125 ml of Omnipaque 350     CT Radiation dose: Integrated dose-length product (DLP) for this visit =    170 mGy*cm.   CT Dose Reduction Employed: Automated exposure control (AEC)     COMPARISON: 08/29/2014 MRI enterography, outside images 02/20/2023 MRI   liver.     RESULT:     GI Tract: No bowel wall thickening, dilation, or obstruction.     Small bowel:  No mural hyperenhancement or wall thickening.   Colon and Rectum:  No mural hyperenhancement or wall thickening.   Strictures:  None   Fistulae/Sinus tracts:  None   Abscess:  None     Abdomen:     Liver: 1.2 cm right hepatic lobe hypodensity, likely a small hemangioma   correlating with prior MRI. Focal fat along the gallbladder fossa and   along the falciform ligament.  No new or suspicious lesions.     Biliary: No bile duct dilation.  Gallbladder is unremarkable.     Spleen: No mass. No splenomegaly.     Pancreas: No mass or duct dilation.     Adrenals: No mass.     Kidneys: No suspicious renal lesions, calculus,  or hydronephrosis.     Lymph nodes: No lymphadenopathy by size.     Mesentery/Peritoneum: No ascites or organized collection.     Vasculature:  The celiac axis and SMA are patent. The portal vein and   branches, splenic vein, SMV, and hepatic veins are patent.  No abdominal   aortic aneurysm.     Pelvis: 3.9 cm right ovarian cyst, likely physiologic.  Trace free fluid.    Partially decompressed urinary bladder is unremarkable. No organized   intrapelvic collection.     Bones/Soft Tissues: Mild S-shaped thoracolumbar scoliosis and lumbar   spine degenerative changes.  Sclerosis at the inferior aspect of the   right sacroiliac joint (3:379), more likely degenerative.     Lung Bases: Unremarkable.

## 2024-02-26 ENCOUNTER — OFFICE VISIT (OUTPATIENT)
Dept: PRIMARY CARE CLINIC | Age: 39
End: 2024-02-26
Payer: MEDICARE

## 2024-02-26 VITALS
HEART RATE: 76 BPM | OXYGEN SATURATION: 99 % | HEIGHT: 60 IN | SYSTOLIC BLOOD PRESSURE: 118 MMHG | WEIGHT: 129.2 LBS | TEMPERATURE: 97.7 F | BODY MASS INDEX: 25.36 KG/M2 | DIASTOLIC BLOOD PRESSURE: 80 MMHG

## 2024-02-26 DIAGNOSIS — J02.9 SORE THROAT: ICD-10-CM

## 2024-02-26 DIAGNOSIS — R53.83 OTHER FATIGUE: ICD-10-CM

## 2024-02-26 DIAGNOSIS — H92.01 OTALGIA OF RIGHT EAR: ICD-10-CM

## 2024-02-26 DIAGNOSIS — R05.1 ACUTE COUGH: ICD-10-CM

## 2024-02-26 DIAGNOSIS — J01.40 ACUTE NON-RECURRENT PANSINUSITIS: Primary | ICD-10-CM

## 2024-02-26 LAB
INFLUENZA A ANTIGEN, POC: NEGATIVE
INFLUENZA B ANTIGEN, POC: NEGATIVE
LOT EXPIRE DATE: NORMAL
LOT KIT NUMBER: NORMAL
S PYO AG THROAT QL: NORMAL
SARS-COV-2, POC: NORMAL
VALID INTERNAL CONTROL: NORMAL
VENDOR AND KIT NAME POC: NORMAL

## 2024-02-26 PROCEDURE — 87880 STREP A ASSAY W/OPTIC: CPT | Performed by: NURSE PRACTITIONER

## 2024-02-26 PROCEDURE — 99213 OFFICE O/P EST LOW 20 MIN: CPT | Performed by: NURSE PRACTITIONER

## 2024-02-26 PROCEDURE — 87428 SARSCOV & INF VIR A&B AG IA: CPT | Performed by: NURSE PRACTITIONER

## 2024-02-26 PROCEDURE — G8427 DOCREV CUR MEDS BY ELIG CLIN: HCPCS | Performed by: NURSE PRACTITIONER

## 2024-02-26 PROCEDURE — G8484 FLU IMMUNIZE NO ADMIN: HCPCS | Performed by: NURSE PRACTITIONER

## 2024-02-26 PROCEDURE — G8419 CALC BMI OUT NRM PARAM NOF/U: HCPCS | Performed by: NURSE PRACTITIONER

## 2024-02-26 PROCEDURE — 1036F TOBACCO NON-USER: CPT | Performed by: NURSE PRACTITIONER

## 2024-02-26 RX ORDER — NAPROXEN 500 MG/1
500 TABLET ORAL 2 TIMES DAILY PRN
COMMUNITY
Start: 2023-08-21 | End: 2024-04-17

## 2024-02-26 RX ORDER — PREDNISONE 20 MG/1
20 TABLET ORAL 2 TIMES DAILY
Qty: 10 TABLET | Refills: 0 | Status: SHIPPED | OUTPATIENT
Start: 2024-02-26 | End: 2024-03-02

## 2024-02-26 RX ORDER — GABAPENTIN 100 MG/1
100 CAPSULE ORAL NIGHTLY
COMMUNITY
Start: 2023-12-29 | End: 2024-03-28

## 2024-02-26 RX ORDER — METOCLOPRAMIDE 5 MG/1
5 TABLET ORAL PRN
COMMUNITY

## 2024-02-26 RX ORDER — AMOXICILLIN AND CLAVULANATE POTASSIUM 875; 125 MG/1; MG/1
1 TABLET, FILM COATED ORAL 2 TIMES DAILY
Qty: 20 TABLET | Refills: 0 | Status: SHIPPED | OUTPATIENT
Start: 2024-02-26 | End: 2024-03-07

## 2024-02-26 RX ORDER — PROMETHAZINE HYDROCHLORIDE 25 MG/1
25 TABLET ORAL PRN
COMMUNITY
Start: 2023-01-05

## 2024-02-26 RX ORDER — HYDROXYZINE HYDROCHLORIDE 25 MG/1
TABLET, FILM COATED ORAL
COMMUNITY
Start: 2023-08-21

## 2024-02-26 SDOH — ECONOMIC STABILITY: FOOD INSECURITY: WITHIN THE PAST 12 MONTHS, YOU WORRIED THAT YOUR FOOD WOULD RUN OUT BEFORE YOU GOT MONEY TO BUY MORE.: NEVER TRUE

## 2024-02-26 SDOH — ECONOMIC STABILITY: FOOD INSECURITY: WITHIN THE PAST 12 MONTHS, THE FOOD YOU BOUGHT JUST DIDN'T LAST AND YOU DIDN'T HAVE MONEY TO GET MORE.: NEVER TRUE

## 2024-02-26 SDOH — ECONOMIC STABILITY: INCOME INSECURITY: HOW HARD IS IT FOR YOU TO PAY FOR THE VERY BASICS LIKE FOOD, HOUSING, MEDICAL CARE, AND HEATING?: NOT HARD AT ALL

## 2024-02-26 SDOH — ECONOMIC STABILITY: HOUSING INSECURITY
IN THE LAST 12 MONTHS, WAS THERE A TIME WHEN YOU DID NOT HAVE A STEADY PLACE TO SLEEP OR SLEPT IN A SHELTER (INCLUDING NOW)?: NO

## 2024-02-26 ASSESSMENT — PATIENT HEALTH QUESTIONNAIRE - PHQ9
1. LITTLE INTEREST OR PLEASURE IN DOING THINGS: 0
SUM OF ALL RESPONSES TO PHQ9 QUESTIONS 1 & 2: 2
SUM OF ALL RESPONSES TO PHQ QUESTIONS 1-9: 2
2. FEELING DOWN, DEPRESSED OR HOPELESS: 2
SUM OF ALL RESPONSES TO PHQ QUESTIONS 1-9: 2

## 2024-02-26 NOTE — PROGRESS NOTES
Chief Complaint:   Otalgia (Symptoms for about 5 days), Fatigue, Pharyngitis, and Dizziness      History of Present Illness   Source of history provided by:  patient.     Haily Samayoa is a 38 y.o. old female who presents to primary care for evaluation of sinus pressure, nasal congestion, discolored nasal drainage, bilateral ear pressure, mild non-productiveproductive cough and sore throat x 4 days. Has been taking  naproxin  OTC without relief. Denies any fever, chills, wheezing, CP, SOB, or GI symptoms. denies any hx of asthma, COPD, or tobacco use.    Review of Systems   Unless otherwise stated in this report or unable to obtain because of the patient's clinical or mental status as evidenced by the medical record, this patients's positive and negative responses for Review of Systems, constitutional, psych, eyes, ENT, cardiovascular, respiratory, gastrointestinal, neurological, genitourinary, musculoskeletal, integument systems and systems related to the presenting problem are either stated in the preceding or were negative for the symptoms and/or complaints related to the medical problem.    Past Medical History:  has no past medical history on file.   Past Surgical History:  has no past surgical history on file.  Social History:  reports that she has never smoked. She has never used smokeless tobacco.  Family History: family history is not on file.  Allergies: Doxycycline, Cephalexin, Dyclonine, Iodinated contrast media, Iodine, Mercaptopurine, Mesalamine, Milk (cow), Nsaids, Azithromycin, and Sulfa antibiotics    Physical Exam   Vital Signs:  /80 (Site: Left Upper Arm, Position: Sitting, Cuff Size: Large Adult)   Pulse 76   Temp 97.7 °F (36.5 °C)   Ht 1.524 m (5')   Wt 58.6 kg (129 lb 3.2 oz)   SpO2 99%   BMI 25.23 kg/m²    Oxygen Saturation Interpretation: Normal.    Constitutional:  Alert, development consistent with age.  Ears: Bilateral pinna normal. TMs abnormalwith erythema with no

## 2024-07-10 ENCOUNTER — TELEPHONE (OUTPATIENT)
Dept: PRIMARY CARE | Facility: CLINIC | Age: 39
End: 2024-07-10
Payer: COMMERCIAL

## 2024-07-10 NOTE — TELEPHONE ENCOUNTER
Ryann wants to know if you can write a letter to keep custody of her daughter Gail. Her ex is filing to take her custody away from her. She said if you need to see her she will come in. She hasn't had any panic attacks since she was in and is doing great.
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
## 2024-07-31 ENCOUNTER — OFFICE VISIT (OUTPATIENT)
Dept: PRIMARY CARE CLINIC | Age: 39
End: 2024-07-31
Payer: MEDICARE

## 2024-07-31 VITALS
TEMPERATURE: 98 F | BODY MASS INDEX: 25.82 KG/M2 | OXYGEN SATURATION: 98 % | DIASTOLIC BLOOD PRESSURE: 60 MMHG | SYSTOLIC BLOOD PRESSURE: 124 MMHG | HEART RATE: 82 BPM | WEIGHT: 132.2 LBS

## 2024-07-31 DIAGNOSIS — M79.672 ACUTE FOOT PAIN, LEFT: Primary | ICD-10-CM

## 2024-07-31 DIAGNOSIS — L03.116 CELLULITIS OF FOOT, LEFT: ICD-10-CM

## 2024-07-31 LAB
BASOPHILS ABSOLUTE: 0.03 K/UL (ref 0–0.2)
BASOPHILS RELATIVE PERCENT: 0 % (ref 0–2)
EOSINOPHILS ABSOLUTE: 0.42 K/UL (ref 0.05–0.5)
EOSINOPHILS RELATIVE PERCENT: 6 % (ref 0–6)
HCT VFR BLD CALC: 65.9 % (ref 34–48)
HEMOGLOBIN: 20.8 G/DL (ref 11.5–15.5)
IMMATURE GRANULOCYTES %: 0 % (ref 0–5)
IMMATURE GRANULOCYTES ABSOLUTE: <0.03 K/UL (ref 0–0.58)
LYMPHOCYTES ABSOLUTE: 3.21 K/UL (ref 1.5–4)
LYMPHOCYTES RELATIVE PERCENT: 46 % (ref 20–42)
MCH RBC QN AUTO: 29.3 PG (ref 26–35)
MCHC RBC AUTO-ENTMCNC: 31.6 G/DL (ref 32–34.5)
MCV RBC AUTO: 92.8 FL (ref 80–99.9)
MONOCYTES ABSOLUTE: 0.21 K/UL (ref 0.1–0.95)
MONOCYTES RELATIVE PERCENT: 3 % (ref 2–12)
NEUTROPHILS ABSOLUTE: 3.11 K/UL (ref 1.8–7.3)
NEUTROPHILS RELATIVE PERCENT: 44 % (ref 43–80)
PDW BLD-RTO: 14.3 % (ref 11.5–15)
PLATELET # BLD: 117 K/UL (ref 130–450)
PMV BLD AUTO: 9.3 FL (ref 7–12)
RBC # BLD: 7.1 M/UL (ref 3.5–5.5)
WBC # BLD: 7 K/UL (ref 4.5–11.5)

## 2024-07-31 PROCEDURE — 1036F TOBACCO NON-USER: CPT | Performed by: EMERGENCY MEDICINE

## 2024-07-31 PROCEDURE — 99214 OFFICE O/P EST MOD 30 MIN: CPT | Performed by: EMERGENCY MEDICINE

## 2024-07-31 PROCEDURE — 36415 COLL VENOUS BLD VENIPUNCTURE: CPT | Performed by: EMERGENCY MEDICINE

## 2024-07-31 PROCEDURE — G8427 DOCREV CUR MEDS BY ELIG CLIN: HCPCS | Performed by: EMERGENCY MEDICINE

## 2024-07-31 PROCEDURE — G8419 CALC BMI OUT NRM PARAM NOF/U: HCPCS | Performed by: EMERGENCY MEDICINE

## 2024-07-31 PROCEDURE — 96372 THER/PROPH/DIAG INJ SC/IM: CPT | Performed by: EMERGENCY MEDICINE

## 2024-07-31 RX ORDER — AMOXICILLIN 875 MG/1
875 TABLET, COATED ORAL 2 TIMES DAILY
Qty: 20 TABLET | Refills: 0 | Status: SHIPPED | OUTPATIENT
Start: 2024-07-31 | End: 2024-08-10

## 2024-07-31 RX ORDER — KETOROLAC TROMETHAMINE 30 MG/ML
30 INJECTION, SOLUTION INTRAMUSCULAR; INTRAVENOUS ONCE
Status: COMPLETED | OUTPATIENT
Start: 2024-07-31 | End: 2024-07-31

## 2024-07-31 RX ADMIN — KETOROLAC TROMETHAMINE 30 MG: 30 INJECTION, SOLUTION INTRAMUSCULAR; INTRAVENOUS at 16:03

## 2024-07-31 ASSESSMENT — ENCOUNTER SYMPTOMS
SHORTNESS OF BREATH: 0
EYE PAIN: 0
COUGH: 0
BACK PAIN: 0
COLOR CHANGE: 1
NAUSEA: 0
EYE DISCHARGE: 0
SINUS PRESSURE: 0
SORE THROAT: 0
ABDOMINAL DISTENTION: 0
VOMITING: 0
DIARRHEA: 0
WHEEZING: 0
EYE REDNESS: 0

## 2024-07-31 NOTE — PROGRESS NOTES
Radiologist unless otherwise noted.  XR FOOT LEFT (MIN 3 VIEWS)    Result Date: 2024                                      [facility]                                          [place] [address] [phone]                                                XRay Report                                                  Signed    Patient: HAILY BAUTISTA                                                                MR#: [id number]          : 1985                                                                Acct:[id number]            Age/Sex: 38 / F                                                                Admit Date: 24            Loc: [facility]                                                                                    Attending Dr: Nawaf Tavera    Ordering Physician: Nawaf Tavera    Date of Service: 24  Procedure(s): XR foot LT min 3V  Accession Number(s): [id number]    cc: Nawaf Tavera      HISTORY:  Injury.     TECHNIQUE:  AP, lateral, oblique views.         FINDINGS:  There is joint space narrowing at the first metatarsophalangeal joint with bony spurring.     Remainder bony structures are normally aligned.     No fracture or subluxation.  No radiopaque foreign body.     Impression     Left foot:  Mild osteoarthritis.                 Dictated By: Jeison Vega MD   DD/DT: 24 172               Signed By: Jeison Vega MD 24 172            : LIUDMILA         Assessment / Plan   Impression(s):  Haily was seen today for foot pain.    Diagnoses and all orders for this visit:    Acute foot pain, left  -     ketorolac (TORADOL) injection 30 mg  -     XR FOOT LEFT (MIN 3 VIEWS); Future  -     CBC with Auto Differential; Future  -     CBC with Auto Differential    Cellulitis of foot, left  -     XR FOOT LEFT (MIN 3

## 2024-09-23 ENCOUNTER — APPOINTMENT (OUTPATIENT)
Dept: PRIMARY CARE | Facility: CLINIC | Age: 39
End: 2024-09-23
Payer: COMMERCIAL

## 2024-09-23 VITALS
HEART RATE: 81 BPM | OXYGEN SATURATION: 98 % | DIASTOLIC BLOOD PRESSURE: 74 MMHG | BODY MASS INDEX: 25.91 KG/M2 | WEIGHT: 132 LBS | HEIGHT: 60 IN | SYSTOLIC BLOOD PRESSURE: 106 MMHG | TEMPERATURE: 97.7 F

## 2024-09-23 DIAGNOSIS — Z00.00 ANNUAL PHYSICAL EXAM: Primary | ICD-10-CM

## 2024-09-23 DIAGNOSIS — Z91.410 HISTORY OF ADULT DOMESTIC PHYSICAL ABUSE: ICD-10-CM

## 2024-09-23 DIAGNOSIS — F43.10 PTSD (POST-TRAUMATIC STRESS DISORDER): ICD-10-CM

## 2024-09-23 DIAGNOSIS — F43.0 PANIC ATTACK AS REACTION TO STRESS: ICD-10-CM

## 2024-09-23 DIAGNOSIS — M79.7 FIBROMYALGIA: ICD-10-CM

## 2024-09-23 DIAGNOSIS — Q21.12 PATENT FORAMEN OVALE (HHS-HCC): ICD-10-CM

## 2024-09-23 DIAGNOSIS — K50.018 CROHN'S DISEASE OF SMALL INTESTINE WITH OTHER COMPLICATION: ICD-10-CM

## 2024-09-23 DIAGNOSIS — M54.9 CHRONIC BACK PAIN, UNSPECIFIED BACK LOCATION, UNSPECIFIED BACK PAIN LATERALITY: ICD-10-CM

## 2024-09-23 DIAGNOSIS — M06.4 INFLAMMATORY POLYARTHROPATHY (MULTI): ICD-10-CM

## 2024-09-23 DIAGNOSIS — G89.29 CHRONIC BACK PAIN, UNSPECIFIED BACK LOCATION, UNSPECIFIED BACK PAIN LATERALITY: ICD-10-CM

## 2024-09-23 DIAGNOSIS — F41.0 PANIC ATTACK AS REACTION TO STRESS: ICD-10-CM

## 2024-09-23 PROCEDURE — 1036F TOBACCO NON-USER: CPT | Performed by: FAMILY MEDICINE

## 2024-09-23 PROCEDURE — 99395 PREV VISIT EST AGE 18-39: CPT | Performed by: FAMILY MEDICINE

## 2024-09-23 PROCEDURE — 99214 OFFICE O/P EST MOD 30 MIN: CPT | Performed by: FAMILY MEDICINE

## 2024-09-23 PROCEDURE — 3008F BODY MASS INDEX DOCD: CPT | Performed by: FAMILY MEDICINE

## 2024-09-23 ASSESSMENT — PATIENT HEALTH QUESTIONNAIRE - PHQ9
1. LITTLE INTEREST OR PLEASURE IN DOING THINGS: NOT AT ALL
2. FEELING DOWN, DEPRESSED OR HOPELESS: NOT AT ALL
SUM OF ALL RESPONSES TO PHQ9 QUESTIONS 1 AND 2: 0

## 2024-09-23 NOTE — LETTER
To whom it may concern:        Ryann Kuhn was seen in my office on September 23, 2024.  At that time we reviewed her medical conditions and also did physical exam.  The patient's is stable and all of her chronic conditions.  Physically she is doing very well.  She does deal with some pain issues but this seems to improve whenever she does have her infusions.  If there are any questions, please contact our office at [phone].    Respectfully,  Dr. Mehul Suarez

## 2024-09-23 NOTE — LETTER
September 23, 2024     Patient: Ryann Kuhn   YOB: 1985   Date of Visit: 9/23/2024       To Whom It May Concern:                               Ryann Kuhn was seen in my office on September 23, 2024.  At that time we reviewed her medical conditions and also preformed a physical exam.  The patient's chronic conditions are currently stable.  Physically she is doing very well.  She does deal with some pain issues but this seems to improve whenever she does have her infusions.  If there are any questions, please contact our office at [phone].     Respectfully,    Mehul Suarez,         CC: No Recipients
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
## 2024-11-14 PROBLEM — D84.9 IMMUNOSUPPRESSION: Status: ACTIVE | Noted: 2024-11-14

## 2024-12-09 ENCOUNTER — TELEPHONE (OUTPATIENT)
Dept: PRIMARY CARE | Facility: CLINIC | Age: 39
End: 2024-12-09
Payer: COMMERCIAL
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
## 2024-12-10 NOTE — TELEPHONE ENCOUNTER
Spoke with patient and she was advised of Dr. Erick garcia. She will follow up in January, I also placed her on a cancellation list.

## 2024-12-17 ENCOUNTER — TELEPHONE (OUTPATIENT)
Dept: PRIMARY CARE | Facility: CLINIC | Age: 39
End: 2024-12-17
Payer: COMMERCIAL

## 2024-12-17 NOTE — TELEPHONE ENCOUNTER
On Friday we received a form from [place] [employer] in regards to disability. On the top of the page the note written is cut off, left message for patient to call the office. Please confirm if this belongs to the patient.

## 2025-01-03 ENCOUNTER — TELEPHONE (OUTPATIENT)
Dept: PRIMARY CARE | Facility: CLINIC | Age: 40
End: 2025-01-03
Payer: COMMERCIAL

## 2025-01-03 DIAGNOSIS — K50.018 CROHN'S DISEASE OF SMALL INTESTINE WITH OTHER COMPLICATION: Primary | ICD-10-CM

## 2025-01-03 RX ORDER — PREDNISONE 10 MG/1
TABLET ORAL
Qty: 45 TABLET | Refills: 0 | Status: SHIPPED | OUTPATIENT
Start: 2025-01-03 | End: 2025-01-17

## 2025-01-03 NOTE — TELEPHONE ENCOUNTER
I can send in a short course, but she should follow up with her GI. Start prednisone 50 mg x 3 days, then 40 mg x 3 days, then 30 mg x 3 days, then 20 mg x 3 days, then 10 mg x 3 days

## 2025-01-03 NOTE — TELEPHONE ENCOUNTER
Pt left message, wanted to know if you could call steroids into [facility] for her chron's flare up

## 2025-01-06 NOTE — TELEPHONE ENCOUNTER
Spoke with pt, she said that the form can be printed offline, I will print it off, she requested when complete if we could mail and fax it to her     F: [phone]

## 2025-01-28 ENCOUNTER — APPOINTMENT (OUTPATIENT)
Dept: PRIMARY CARE | Facility: CLINIC | Age: 40
End: 2025-01-28
Payer: COMMERCIAL

## 2025-01-28 VITALS
TEMPERATURE: 97.6 F | SYSTOLIC BLOOD PRESSURE: 98 MMHG | WEIGHT: 131 LBS | BODY MASS INDEX: 25.72 KG/M2 | DIASTOLIC BLOOD PRESSURE: 58 MMHG | HEIGHT: 60 IN | HEART RATE: 98 BPM | OXYGEN SATURATION: 99 %

## 2025-01-28 DIAGNOSIS — F43.0 PANIC ATTACK AS REACTION TO STRESS: ICD-10-CM

## 2025-01-28 DIAGNOSIS — F43.10 PTSD (POST-TRAUMATIC STRESS DISORDER): ICD-10-CM

## 2025-01-28 DIAGNOSIS — F41.9 ANXIETY: ICD-10-CM

## 2025-01-28 DIAGNOSIS — R13.19 OTHER DYSPHAGIA: ICD-10-CM

## 2025-01-28 DIAGNOSIS — Z86.73 HISTORY OF STROKE: ICD-10-CM

## 2025-01-28 DIAGNOSIS — Z91.410 HISTORY OF ADULT DOMESTIC PHYSICAL ABUSE: ICD-10-CM

## 2025-01-28 DIAGNOSIS — Z00.00 MEDICARE ANNUAL WELLNESS VISIT, SUBSEQUENT: Primary | ICD-10-CM

## 2025-01-28 DIAGNOSIS — M05.79 RHEUMATOID ARTHRITIS INVOLVING MULTIPLE SITES WITH POSITIVE RHEUMATOID FACTOR (MULTI): ICD-10-CM

## 2025-01-28 DIAGNOSIS — G56.03 BILATERAL CARPAL TUNNEL SYNDROME: ICD-10-CM

## 2025-01-28 DIAGNOSIS — R53.83 FATIGUE, UNSPECIFIED TYPE: ICD-10-CM

## 2025-01-28 DIAGNOSIS — M54.2 CERVICALGIA: ICD-10-CM

## 2025-01-28 DIAGNOSIS — F41.0 PANIC ATTACK AS REACTION TO STRESS: ICD-10-CM

## 2025-01-28 DIAGNOSIS — Z78.0 POSTMENOPAUSAL: ICD-10-CM

## 2025-01-28 DIAGNOSIS — D84.9 IMMUNOSUPPRESSION: ICD-10-CM

## 2025-01-28 DIAGNOSIS — E55.9 VITAMIN D DEFICIENCY: ICD-10-CM

## 2025-01-28 DIAGNOSIS — K50.919 CROHN'S DISEASE WITH COMPLICATION, UNSPECIFIED GASTROINTESTINAL TRACT LOCATION: ICD-10-CM

## 2025-01-28 DIAGNOSIS — F41.0 PANIC DISORDER WITHOUT AGORAPHOBIA: ICD-10-CM

## 2025-01-28 DIAGNOSIS — M79.7 FIBROMYALGIA: ICD-10-CM

## 2025-01-28 DIAGNOSIS — M06.4 INFLAMMATORY POLYARTHROPATHY (MULTI): ICD-10-CM

## 2025-01-28 DIAGNOSIS — M85.89 OSTEOPENIA OF MULTIPLE SITES: ICD-10-CM

## 2025-01-28 DIAGNOSIS — R79.0 LOW MAGNESIUM LEVEL: ICD-10-CM

## 2025-01-28 DIAGNOSIS — Q21.12 PATENT FORAMEN OVALE (HHS-HCC): ICD-10-CM

## 2025-01-28 PROCEDURE — 3008F BODY MASS INDEX DOCD: CPT | Performed by: FAMILY MEDICINE

## 2025-01-28 PROCEDURE — G0439 PPPS, SUBSEQ VISIT: HCPCS | Performed by: FAMILY MEDICINE

## 2025-01-28 PROCEDURE — 99214 OFFICE O/P EST MOD 30 MIN: CPT | Performed by: FAMILY MEDICINE

## 2025-01-28 RX ORDER — CYCLOBENZAPRINE HCL 10 MG
10 TABLET ORAL 2 TIMES DAILY
Qty: 60 TABLET | Refills: 3 | Status: SHIPPED | OUTPATIENT
Start: 2025-01-28 | End: 2026-01-28

## 2025-01-28 RX ORDER — HYDROXYZINE HYDROCHLORIDE 25 MG/1
25 TABLET, FILM COATED ORAL 3 TIMES DAILY
Qty: 90 TABLET | Refills: 3 | Status: SHIPPED | OUTPATIENT
Start: 2025-01-28 | End: 2025-05-28

## 2025-01-28 RX ORDER — ACETAMINOPHEN 325 MG/1
325 TABLET ORAL EVERY 8 HOURS PRN
COMMUNITY

## 2025-01-28 ASSESSMENT — ENCOUNTER SYMPTOMS
CONFUSION: 1
DYSPHORIC MOOD: 0
RHINORRHEA: 0
WEAKNESS: 1
UNEXPECTED WEIGHT CHANGE: 1
DECREASED CONCENTRATION: 1
HEADACHES: 0
CONSTIPATION: 1
PALPITATIONS: 1
COUGH: 0
CHEST TIGHTNESS: 1
EYE ITCHING: 1
DIZZINESS: 1
SINUS PRESSURE: 0
EYE PAIN: 1
FATIGUE: 1
ACTIVITY CHANGE: 1
VOMITING: 0
SINUS PAIN: 0
DIARRHEA: 1
ABDOMINAL PAIN: 1
NAUSEA: 1
ANAL BLEEDING: 1
FACIAL ASYMMETRY: 0
EYE DISCHARGE: 1
NERVOUS/ANXIOUS: 1
WHEEZING: 0
SHORTNESS OF BREATH: 0
SORE THROAT: 0
APPETITE CHANGE: 1
LIGHT-HEADEDNESS: 1
SLEEP DISTURBANCE: 1

## 2025-01-28 ASSESSMENT — ACTIVITIES OF DAILY LIVING (ADL)
GROCERY_SHOPPING: INDEPENDENT
TAKING_MEDICATION: INDEPENDENT
DOING_HOUSEWORK: NEEDS ASSISTANCE
MANAGING_FINANCES: INDEPENDENT
BATHING: INDEPENDENT
DRESSING: INDEPENDENT

## 2025-01-28 ASSESSMENT — PATIENT HEALTH QUESTIONNAIRE - PHQ9
SUM OF ALL RESPONSES TO PHQ9 QUESTIONS 1 AND 2: 0
1. LITTLE INTEREST OR PLEASURE IN DOING THINGS: NOT AT ALL
2. FEELING DOWN, DEPRESSED OR HOPELESS: NOT AT ALL
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
## 2025-02-13 ENCOUNTER — TELEPHONE (OUTPATIENT)
Dept: PRIMARY CARE | Facility: CLINIC | Age: 40
End: 2025-02-13
Payer: COMMERCIAL

## 2025-02-13 NOTE — TELEPHONE ENCOUNTER
Pt lm stating she is needing an antibiotic, she is very sick and cannot get up, she is having a cough and fever and states she tried going to urgent care but there were too many people there and they did not do anything for her. I spoke with pt and advised that she try an online virtual sick visit since we do not have any appointments available with Dr Suarez.

## 2025-02-17 ENCOUNTER — HOSPITAL ENCOUNTER (OUTPATIENT)
Dept: RADIOLOGY | Facility: HOSPITAL | Age: 40
Discharge: HOME | End: 2025-02-17
Payer: COMMERCIAL

## 2025-02-17 DIAGNOSIS — R13.12 OROPHARYNGEAL DYSPHAGIA: Primary | ICD-10-CM

## 2025-02-17 DIAGNOSIS — R13.19 OTHER DYSPHAGIA: ICD-10-CM

## 2025-02-17 DIAGNOSIS — M54.2 CERVICALGIA: ICD-10-CM

## 2025-02-17 PROCEDURE — 74230 X-RAY XM SWLNG FUNCJ C+: CPT | Performed by: RADIOLOGY

## 2025-02-17 PROCEDURE — 74230 X-RAY XM SWLNG FUNCJ C+: CPT

## 2025-02-17 PROCEDURE — 72040 X-RAY EXAM NECK SPINE 2-3 VW: CPT | Performed by: RADIOLOGY

## 2025-02-17 PROCEDURE — 72040 X-RAY EXAM NECK SPINE 2-3 VW: CPT

## 2025-02-17 PROCEDURE — 92611 MOTION FLUOROSCOPY/SWALLOW: CPT | Mod: GN | Performed by: SPEECH-LANGUAGE PATHOLOGIST

## 2025-02-17 PROCEDURE — 2500000005 HC RX 250 GENERAL PHARMACY W/O HCPCS: Performed by: FAMILY MEDICINE

## 2025-02-17 RX ADMIN — BARIUM SULFATE 10 ML: 400 SUSPENSION ORAL at 14:21

## 2025-02-17 RX ADMIN — BARIUM SULFATE 20 ML: 400 SUSPENSION ORAL at 14:21

## 2025-02-17 RX ADMIN — BARIUM SULFATE 10 ML: 400 PASTE ORAL at 14:21

## 2025-02-17 RX ADMIN — BARIUM SULFATE 135 ML: 0.81 POWDER, FOR SUSPENSION ORAL at 14:22

## 2025-02-17 RX ADMIN — BARIUM SULFATE 700 MG: 700 TABLET ORAL at 14:20
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
## 2025-02-17 NOTE — PATIENT INSTRUCTIONS
DIET:   - Regular (IDDSI Level 7)  - Thin liquids (IDDSI Level 0)    Per the results of today's MBSS it is recommended pt perform the following strategies listed below:    STRATEGIES:  - Upright positioning for all PO intake  - Remain upright for >30 min after meals  - Alternate bites and sips    Take pills whole in pudding/applesauce/yogurt etc or wrap it in jelly.    Consider reflux precautions    Recommend ENT consult d/t c/o throat pain at rest and when swallowing as well as c/o raspy vocal quality.  Depending on ENT results pt may benefit from speech therapy.

## 2025-02-19 ENCOUNTER — TELEPHONE (OUTPATIENT)
Dept: PRIMARY CARE | Facility: CLINIC | Age: 40
End: 2025-02-19
Payer: COMMERCIAL

## 2025-02-19 NOTE — TELEPHONE ENCOUNTER
----- Message from Mehul Suarez sent at 2/18/2025  7:41 PM EST -----  Please let the patient know that the results of her x-ray of the cervical spine were normal

## 2025-02-19 NOTE — TELEPHONE ENCOUNTER
----- Message from Mehul Suarez sent at 2/18/2025  7:46 PM EST -----  Please let the patient know that overall her swallow study is normal

## 2025-07-28 ENCOUNTER — APPOINTMENT (OUTPATIENT)
Dept: PRIMARY CARE | Facility: CLINIC | Age: 40
End: 2025-07-28
Payer: COMMERCIAL

## 2025-08-19 ASSESSMENT — ENCOUNTER SYMPTOMS
HEADACHES: 0
EYE ITCHING: 1
RHINORRHEA: 0
ABDOMINAL PAIN: 1
WEAKNESS: 1
NERVOUS/ANXIOUS: 1
CONSTIPATION: 1
FACIAL ASYMMETRY: 0
COUGH: 0
DIARRHEA: 1
EYE DISCHARGE: 1
WHEEZING: 0
APPETITE CHANGE: 1
ANAL BLEEDING: 1
DYSPHORIC MOOD: 0
DECREASED CONCENTRATION: 1
DIZZINESS: 1
SLEEP DISTURBANCE: 1
SINUS PRESSURE: 0
SORE THROAT: 0
SHORTNESS OF BREATH: 0
UNEXPECTED WEIGHT CHANGE: 1
CONFUSION: 1
PALPITATIONS: 1
LIGHT-HEADEDNESS: 1
ACTIVITY CHANGE: 1
NAUSEA: 1
FATIGUE: 1
EYE PAIN: 1
SINUS PAIN: 0
CHEST TIGHTNESS: 1
VOMITING: 0

## 2025-08-20 ENCOUNTER — APPOINTMENT (OUTPATIENT)
Dept: PRIMARY CARE | Facility: CLINIC | Age: 40
End: 2025-08-20
Payer: COMMERCIAL

## 2025-08-20 DIAGNOSIS — G56.03 BILATERAL CARPAL TUNNEL SYNDROME: ICD-10-CM

## 2025-08-20 DIAGNOSIS — R79.0 LOW MAGNESIUM LEVEL: ICD-10-CM

## 2025-08-20 DIAGNOSIS — R53.83 OTHER FATIGUE: ICD-10-CM

## 2025-08-20 DIAGNOSIS — M79.672 PAIN IN BOTH FEET: ICD-10-CM

## 2025-08-20 DIAGNOSIS — M05.79 RHEUMATOID ARTHRITIS INVOLVING MULTIPLE SITES WITH POSITIVE RHEUMATOID FACTOR (MULTI): Primary | ICD-10-CM

## 2025-08-20 DIAGNOSIS — K50.018 CROHN'S DISEASE OF SMALL INTESTINE WITH OTHER COMPLICATION: ICD-10-CM

## 2025-08-20 DIAGNOSIS — M79.671 PAIN IN BOTH FEET: ICD-10-CM

## 2025-08-20 DIAGNOSIS — F41.0 PANIC ATTACK AS REACTION TO STRESS: ICD-10-CM

## 2025-08-20 DIAGNOSIS — E55.9 VITAMIN D DEFICIENCY: ICD-10-CM

## 2025-08-20 DIAGNOSIS — F43.0 PANIC ATTACK AS REACTION TO STRESS: ICD-10-CM

## 2025-08-20 PROBLEM — Z86.59 HISTORY OF DEPRESSION: Status: ACTIVE | Noted: 2025-08-20

## 2025-08-20 PROBLEM — H60.90 OTITIS EXTERNA: Status: ACTIVE | Noted: 2025-08-20

## 2025-08-20 PROCEDURE — 99214 OFFICE O/P EST MOD 30 MIN: CPT | Performed by: FAMILY MEDICINE

## 2025-08-20 PROCEDURE — G2211 COMPLEX E/M VISIT ADD ON: HCPCS | Performed by: FAMILY MEDICINE

## 2025-08-20 PROCEDURE — 1036F TOBACCO NON-USER: CPT | Performed by: FAMILY MEDICINE

## 2025-08-20 RX ORDER — NAPROXEN 500 MG/1
500 TABLET ORAL 2 TIMES DAILY PRN
Qty: 60 TABLET | Refills: 0 | Status: SHIPPED | OUTPATIENT
Start: 2025-08-20 | End: 2025-09-19

## 2025-08-20 RX ORDER — CYCLOBENZAPRINE HCL 10 MG
10 TABLET ORAL 2 TIMES DAILY
Qty: 60 TABLET | Refills: 3 | Status: SHIPPED | OUTPATIENT
Start: 2025-08-20 | End: 2025-12-18

## 2025-08-20 RX ORDER — HYDROXYZINE HYDROCHLORIDE 25 MG/1
25 TABLET, FILM COATED ORAL 3 TIMES DAILY
Qty: 90 TABLET | Refills: 3 | Status: SHIPPED | OUTPATIENT
Start: 2025-08-20 | End: 2026-08-20